# Patient Record
Sex: MALE | Race: WHITE | NOT HISPANIC OR LATINO
[De-identification: names, ages, dates, MRNs, and addresses within clinical notes are randomized per-mention and may not be internally consistent; named-entity substitution may affect disease eponyms.]

---

## 2020-10-12 ENCOUNTER — NON-APPOINTMENT (OUTPATIENT)
Age: 20
End: 2020-10-12

## 2020-10-13 ENCOUNTER — APPOINTMENT (OUTPATIENT)
Dept: CARDIOLOGY | Facility: CLINIC | Age: 20
End: 2020-10-13
Payer: COMMERCIAL

## 2020-10-13 VITALS
DIASTOLIC BLOOD PRESSURE: 88 MMHG | HEART RATE: 90 BPM | SYSTOLIC BLOOD PRESSURE: 127 MMHG | BODY MASS INDEX: 41.35 KG/M2 | HEIGHT: 71 IN | WEIGHT: 295.4 LBS | OXYGEN SATURATION: 96 %

## 2020-10-13 VITALS — TEMPERATURE: 97.2 F

## 2020-10-13 DIAGNOSIS — Z78.9 OTHER SPECIFIED HEALTH STATUS: ICD-10-CM

## 2020-10-13 DIAGNOSIS — E66.01 MORBID (SEVERE) OBESITY DUE TO EXCESS CALORIES: ICD-10-CM

## 2020-10-13 DIAGNOSIS — Z86.69 PERSONAL HISTORY OF OTHER DISEASES OF THE NERVOUS SYSTEM AND SENSE ORGANS: ICD-10-CM

## 2020-10-13 DIAGNOSIS — Z83.49 FAMILY HISTORY OF OTHER ENDOCRINE, NUTRITIONAL AND METABOLIC DISEASES: ICD-10-CM

## 2020-10-13 DIAGNOSIS — Z82.49 FAMILY HISTORY OF ISCHEMIC HEART DISEASE AND OTHER DISEASES OF THE CIRCULATORY SYSTEM: ICD-10-CM

## 2020-10-13 DIAGNOSIS — Z87.891 PERSONAL HISTORY OF NICOTINE DEPENDENCE: ICD-10-CM

## 2020-10-13 DIAGNOSIS — R06.00 DYSPNEA, UNSPECIFIED: ICD-10-CM

## 2020-10-13 DIAGNOSIS — Z86.59 PERSONAL HISTORY OF OTHER MENTAL AND BEHAVIORAL DISORDERS: ICD-10-CM

## 2020-10-13 PROCEDURE — 99244 OFF/OP CNSLTJ NEW/EST MOD 40: CPT

## 2020-10-13 RX ORDER — DEXTROAMPHETAMINE SACCHARATE, AMPHETAMINE ASPARTATE MONOHYDRATE, DEXTROAMPHETAMINE SULFATE AND AMPHETAMINE SULFATE 5; 5; 5; 5 MG/1; MG/1; MG/1; MG/1
20 CAPSULE, EXTENDED RELEASE ORAL DAILY
Qty: 30 | Refills: 0 | Status: ACTIVE | COMMUNITY
Start: 2020-10-13

## 2020-10-13 NOTE — HISTORY OF PRESENT ILLNESS
[FreeTextEntry1] : 20-year-old male being seen in cardiac evaluation prior to planned bariatric surgery.  He has been markedly overweight for the past 5 years.  He has no history of heart disease, but has noted progressive dyspnea on exertion for the past year which he has attributed to his weight. \par \par  He has no major medical problems and is never been hospitalized.  He has a history of obstructive sleep apnea and has been on Adderall since his youth for attention deficit disorder.

## 2020-10-13 NOTE — DISCUSSION/SUMMARY
[FreeTextEntry1] : In summary Mr. Hurtado is a 20-year-old male with no significant past medical history who is planning to have bariatric surgery for morbid obesity.  His exam shows a BMI of 41, normal blood pressure, clear lungs, and a normal cardiac exam.  An EKG done yesterday is within normal limits.\par \par His dyspnea on exertion needs to be evaluated before surgery and he will be scheduled for a stress echo.  Assuming it is unremarkable he is cleared to proceed.

## 2020-10-13 NOTE — PHYSICAL EXAM
[General Appearance - Well Developed] : well developed [Normal Appearance] : normal appearance [Well Groomed] : well groomed [General Appearance - Well Nourished] : well nourished [No Deformities] : no deformities [General Appearance - In No Acute Distress] : no acute distress [Normal Conjunctiva] : the conjunctiva exhibited no abnormalities [Eyelids - No Xanthelasma] : the eyelids demonstrated no xanthelasmas [Normal Oral Mucosa] : normal oral mucosa [No Oral Pallor] : no oral pallor [No Oral Cyanosis] : no oral cyanosis [Normal Jugular Venous A Waves Present] : normal jugular venous A waves present [Normal Jugular Venous V Waves Present] : normal jugular venous V waves present [No Jugular Venous Briggs A Waves] : no jugular venous briggs A waves [Heart Rate And Rhythm] : heart rate and rhythm were normal [Heart Sounds] : normal S1 and S2 [Murmurs] : no murmurs present [Respiration, Rhythm And Depth] : normal respiratory rhythm and effort [Exaggerated Use Of Accessory Muscles For Inspiration] : no accessory muscle use [FreeTextEntry1] : Few bibasilar rales [Abdomen Soft] : soft [Abdomen Tenderness] : non-tender [Abdomen Mass (___ Cm)] : no abdominal mass palpated [Abnormal Walk] : normal gait [Gait - Sufficient For Exercise Testing] : the gait was sufficient for exercise testing [Nail Clubbing] : no clubbing of the fingernails [Cyanosis, Localized] : no localized cyanosis [Petechial Hemorrhages (___cm)] : no petechial hemorrhages [Skin Color & Pigmentation] : normal skin color and pigmentation [] : no rash [No Venous Stasis] : no venous stasis [Skin Lesions] : no skin lesions [No Skin Ulcers] : no skin ulcer [No Xanthoma] : no  xanthoma was observed [Oriented To Time, Place, And Person] : oriented to person, place, and time [Affect] : the affect was normal [Mood] : the mood was normal [No Anxiety] : not feeling anxious

## 2020-11-05 ENCOUNTER — APPOINTMENT (OUTPATIENT)
Dept: PULMONOLOGY | Facility: CLINIC | Age: 20
End: 2020-11-05

## 2020-12-22 ENCOUNTER — APPOINTMENT (OUTPATIENT)
Dept: CARDIOLOGY | Facility: CLINIC | Age: 20
End: 2020-12-22

## 2021-01-25 ENCOUNTER — APPOINTMENT (OUTPATIENT)
Dept: CARDIOLOGY | Facility: CLINIC | Age: 21
End: 2021-01-25

## 2021-02-09 ENCOUNTER — APPOINTMENT (OUTPATIENT)
Dept: CARDIOLOGY | Facility: CLINIC | Age: 21
End: 2021-02-09

## 2021-02-12 ENCOUNTER — OUTPATIENT (OUTPATIENT)
Dept: OUTPATIENT SERVICES | Facility: HOSPITAL | Age: 21
LOS: 1 days | End: 2021-02-12
Payer: COMMERCIAL

## 2021-02-12 VITALS
RESPIRATION RATE: 18 BRPM | TEMPERATURE: 98 F | SYSTOLIC BLOOD PRESSURE: 123 MMHG | DIASTOLIC BLOOD PRESSURE: 86 MMHG | WEIGHT: 266.1 LBS | HEIGHT: 71 IN | HEART RATE: 69 BPM | OXYGEN SATURATION: 98 %

## 2021-02-12 DIAGNOSIS — E66.01 MORBID (SEVERE) OBESITY DUE TO EXCESS CALORIES: ICD-10-CM

## 2021-02-12 DIAGNOSIS — Z01.818 ENCOUNTER FOR OTHER PREPROCEDURAL EXAMINATION: ICD-10-CM

## 2021-02-12 DIAGNOSIS — Z29.9 ENCOUNTER FOR PROPHYLACTIC MEASURES, UNSPECIFIED: ICD-10-CM

## 2021-02-12 LAB
ALBUMIN SERPL ELPH-MCNC: 4.5 G/DL — SIGNIFICANT CHANGE UP (ref 3.3–5)
ANION GAP SERPL CALC-SCNC: 8 MMOL/L — SIGNIFICANT CHANGE UP (ref 5–17)
APPEARANCE UR: CLEAR — SIGNIFICANT CHANGE UP
APTT BLD: 34.1 SEC — SIGNIFICANT CHANGE UP (ref 27.5–35.5)
BASOPHILS # BLD AUTO: 0.02 K/UL — SIGNIFICANT CHANGE UP (ref 0–0.2)
BASOPHILS NFR BLD AUTO: 0.3 % — SIGNIFICANT CHANGE UP (ref 0–2)
BILIRUB UR-MCNC: NEGATIVE — SIGNIFICANT CHANGE UP
BUN SERPL-MCNC: 12 MG/DL — SIGNIFICANT CHANGE UP (ref 7–23)
CALCIUM SERPL-MCNC: 10 MG/DL — SIGNIFICANT CHANGE UP (ref 8.5–10.1)
CHLORIDE SERPL-SCNC: 104 MMOL/L — SIGNIFICANT CHANGE UP (ref 96–108)
CO2 SERPL-SCNC: 23 MMOL/L — SIGNIFICANT CHANGE UP (ref 22–31)
COHGB MFR BLDV: 1.3 % — SIGNIFICANT CHANGE UP (ref 0–1.5)
COLOR SPEC: YELLOW — SIGNIFICANT CHANGE UP
CREAT SERPL-MCNC: 0.98 MG/DL — SIGNIFICANT CHANGE UP (ref 0.5–1.3)
DIFF PNL FLD: NEGATIVE — SIGNIFICANT CHANGE UP
EOSINOPHIL # BLD AUTO: 0.15 K/UL — SIGNIFICANT CHANGE UP (ref 0–0.5)
EOSINOPHIL NFR BLD AUTO: 2.3 % — SIGNIFICANT CHANGE UP (ref 0–6)
GLUCOSE SERPL-MCNC: 70 MG/DL — SIGNIFICANT CHANGE UP (ref 70–99)
GLUCOSE UR QL: NEGATIVE MG/DL — SIGNIFICANT CHANGE UP
HCT VFR BLD CALC: 43.2 % — SIGNIFICANT CHANGE UP (ref 39–50)
HGB BLD CALC-MCNC: 14.7 G/DL — SIGNIFICANT CHANGE UP (ref 13–17)
HGB BLD-MCNC: 14.3 G/DL — SIGNIFICANT CHANGE UP (ref 13–17)
IMM GRANULOCYTES NFR BLD AUTO: 0.2 % — SIGNIFICANT CHANGE UP (ref 0–1.5)
INR BLD: 1.25 RATIO — HIGH (ref 0.88–1.16)
KETONES UR-MCNC: ABNORMAL
LEUKOCYTE ESTERASE UR-ACNC: NEGATIVE — SIGNIFICANT CHANGE UP
LYMPHOCYTES # BLD AUTO: 1.8 K/UL — SIGNIFICANT CHANGE UP (ref 1–3.3)
LYMPHOCYTES # BLD AUTO: 28 % — SIGNIFICANT CHANGE UP (ref 13–44)
MCHC RBC-ENTMCNC: 27.3 PG — SIGNIFICANT CHANGE UP (ref 27–34)
MCHC RBC-ENTMCNC: 33.1 GM/DL — SIGNIFICANT CHANGE UP (ref 32–36)
MCV RBC AUTO: 82.6 FL — SIGNIFICANT CHANGE UP (ref 80–100)
MONOCYTES # BLD AUTO: 0.36 K/UL — SIGNIFICANT CHANGE UP (ref 0–0.9)
MONOCYTES NFR BLD AUTO: 5.6 % — SIGNIFICANT CHANGE UP (ref 2–14)
NEUTROPHILS # BLD AUTO: 4.08 K/UL — SIGNIFICANT CHANGE UP (ref 1.8–7.4)
NEUTROPHILS NFR BLD AUTO: 63.6 % — SIGNIFICANT CHANGE UP (ref 43–77)
NITRITE UR-MCNC: NEGATIVE — SIGNIFICANT CHANGE UP
PH UR: 5 — SIGNIFICANT CHANGE UP (ref 5–8)
PLATELET # BLD AUTO: 258 K/UL — SIGNIFICANT CHANGE UP (ref 150–400)
POTASSIUM SERPL-MCNC: 4 MMOL/L — SIGNIFICANT CHANGE UP (ref 3.5–5.3)
POTASSIUM SERPL-SCNC: 4 MMOL/L — SIGNIFICANT CHANGE UP (ref 3.5–5.3)
PROT UR-MCNC: NEGATIVE MG/DL — SIGNIFICANT CHANGE UP
PROTHROM AB SERPL-ACNC: 14.3 SEC — HIGH (ref 10.6–13.6)
RBC # BLD: 5.23 M/UL — SIGNIFICANT CHANGE UP (ref 4.2–5.8)
RBC # FLD: 13.8 % — SIGNIFICANT CHANGE UP (ref 10.3–14.5)
SODIUM SERPL-SCNC: 135 MMOL/L — SIGNIFICANT CHANGE UP (ref 135–145)
SP GR SPEC: 1 — LOW (ref 1.01–1.02)
UROBILINOGEN FLD QL: NEGATIVE MG/DL — SIGNIFICANT CHANGE UP
WBC # BLD: 6.42 K/UL — SIGNIFICANT CHANGE UP (ref 3.8–10.5)
WBC # FLD AUTO: 6.42 K/UL — SIGNIFICANT CHANGE UP (ref 3.8–10.5)

## 2021-02-12 PROCEDURE — 93005 ELECTROCARDIOGRAM TRACING: CPT

## 2021-02-12 PROCEDURE — 93010 ELECTROCARDIOGRAM REPORT: CPT

## 2021-02-12 PROCEDURE — 82040 ASSAY OF SERUM ALBUMIN: CPT

## 2021-02-12 PROCEDURE — 85025 COMPLETE CBC W/AUTO DIFF WBC: CPT

## 2021-02-12 PROCEDURE — 86901 BLOOD TYPING SEROLOGIC RH(D): CPT

## 2021-02-12 PROCEDURE — 71046 X-RAY EXAM CHEST 2 VIEWS: CPT

## 2021-02-12 PROCEDURE — 81003 URINALYSIS AUTO W/O SCOPE: CPT

## 2021-02-12 PROCEDURE — 36415 COLL VENOUS BLD VENIPUNCTURE: CPT

## 2021-02-12 PROCEDURE — 86900 BLOOD TYPING SEROLOGIC ABO: CPT

## 2021-02-12 PROCEDURE — 85730 THROMBOPLASTIN TIME PARTIAL: CPT

## 2021-02-12 PROCEDURE — 71046 X-RAY EXAM CHEST 2 VIEWS: CPT | Mod: 26

## 2021-02-12 PROCEDURE — 86850 RBC ANTIBODY SCREEN: CPT

## 2021-02-12 PROCEDURE — 85610 PROTHROMBIN TIME: CPT

## 2021-02-12 PROCEDURE — 82375 ASSAY CARBOXYHB QUANT: CPT

## 2021-02-12 PROCEDURE — 80048 BASIC METABOLIC PNL TOTAL CA: CPT

## 2021-02-12 PROCEDURE — G0463: CPT | Mod: 25

## 2021-02-12 NOTE — H&P PST ADULT - HISTORY OF PRESENT ILLNESS
20 year old man presents to [facility] for preprocedure exam. Patient is for planned laparoscopic sleeve gastrectomy and intraoperative endoscopy with Dr Johnson. Patient is now on liquid diet and tolerating it well. No acute complaints.

## 2021-02-12 NOTE — H&P PST ADULT - NSANTHOSAYNRD_GEN_A_CORE
No. JUANIS screening performed.  STOP BANG Legend: 0-2 = LOW Risk; 3-4 = INTERMEDIATE Risk; 5-8 = HIGH Risk

## 2021-02-12 NOTE — H&P PST ADULT - ASSESSMENT
20 year old man presents to PST for preprocedure exam. Patient is for planned laparoscopic sleeve gastrectomy and intraoperative endoscopy with Dr Johnson.        Plan:  - PST instructions given ; NPO status instructions to be given by [facility] .  - Pt instructed to take following meds with sip of water : adderall  - Pt instructed to take routine evening medications unless indicated .  -  Stop NSAIDS ( Aspirin Alev Motrin Mobic Diclofenac), herbal supplements , MVI , Vitamin fish oil 7 days prior to surgery  unless   directed by surgeon or cardiologist;   - Medical Optimization  with Dr Díaz  - EZ wash instructions given   - Labs EKG CXR as per surgeon request.   -  Pt instructed to self quarantine .  - Covid Testing scheduled  Pt notified and aware.  - Pt denies covid symptoms shortness of breath fever cough .      CAPRINI SCORE [CLOT]    AGE RELATED RISK FACTORS                                                       MOBILITY RELATED FACTORS  [ ] Age 41-60 years                                            (1 Point)                  [ ] Bed rest                                                        (1 Point)  [ ] Age: 61-74 years                                           (2 Points)                 [ ] Plaster cast                                                   (2 Points)  [ ] Age= 75 years                                              (3 Points)                 [ ] Bed bound for more than 72 hours                 (2 Points)    DISEASE RELATED RISK FACTORS                                               GENDER SPECIFIC FACTORS  [ ] Edema in the lower extremities                       (1 Point)                  [ ] Pregnancy                                                     (1 Point)  [ ] Varicose veins                                               (1 Point)                  [ ] Post-partum < 6 weeks                                   (1 Point)             [x ] BMI > 25 Kg/m2                                            (1 Point)                  [ ] Hormonal therapy  or oral contraception          (1 Point)                 [ ] Sepsis (in the previous month)                        (1 Point)                  [ ] History of pregnancy complications                 (1 point)  [ ] Pneumonia or serious lung disease                                               [ ] Unexplained or recurrent                     (1 Point)           (in the previous month)                               (1 Point)  [ ] Abnormal pulmonary function test                     (1 Point)                 SURGERY RELATED RISK FACTORS  [ ] Acute myocardial infarction                              (1 Point)                 [ ]  Section                                             (1 Point)  [ ] Congestive heart failure (in the previous month)  (1 Point)               [ ] Minor surgery                                                  (1 Point)   [ ] Inflammatory bowel disease                             (1 Point)                 [ ] Arthroscopic surgery                                        (2 Points)  [ ] Central venous access                                      (2 Points)                [x ] General surgery lasting more than 45 minutes   (2 Points)       [ ] Stroke (in the previous month)                          (5 Points)               [ ] Elective arthroplasty                                         (5 Points)                                                                                                                                               HEMATOLOGY RELATED FACTORS                                                 TRAUMA RELATED RISK FACTORS  [ ] Prior episodes of VTE                                     (3 Points)                [ ] Fracture of the hip, pelvis, or leg                       (5 Points)  [ ] Positive family history for VTE                         (3 Points)                 [ ] Acute spinal cord injury (in the previous month)  (5 Points)  [ ] Prothrombin 76768 A                                     (3 Points)                 [ ] Paralysis  (less than 1 month)                             (5 Points)  [ ] Factor V Leiden                                             (3 Points)                  [ ] Multiple Trauma within 1 month                        (5 Points)  [ ] Lupus anticoagulants                                     (3 Points)                                                           [ ] Anticardiolipin antibodies                               (3 Points)                                                       [ ] High homocysteine in the blood                      (3 Points)                                             [ ] Other congenital or acquired thrombophilia      (3 Points)                                                [ ] Heparin induced thrombocytopenia                  (3 Points)                                          Total Score [    3      ]    Caprini Score 0 - 2:  Low Risk, No VTE Prophylaxis required for most patients, encourage ambulation  Caprini Score 3 - 6:  At Risk, pharmacologic VTE prophylaxis is indicated for most patients (in the absence of a contraindication)  Caprini Score Greater than or = 7:  High Risk, pharmacologic VTE prophylaxis is indicated for most patients (in the absence of a contraindication)   20 year old man presents to PST for preprocedure exam. Patient is for planned laparoscopic sleeve gastrectomy and intraoperative endoscopy with Dr Johnson.        Plan:  - PST instructions given ; NPO status instructions to be given by [facility] .  - Pt instructed to take following meds with sip of water : no meds  - Pt instructed to take routine evening medications unless indicated .  -  Stop NSAIDS ( Aspirin Alev Motrin Mobic Diclofenac), herbal supplements , MVI , Vitamin fish oil 7 days prior to surgery  unless   directed by surgeon or cardiologist;   - Medical Optimization  with Dr Díaz  - EZ wash instructions given   - Labs EKG CXR as per surgeon request.   -  Pt instructed to self quarantine .  - Covid Testing scheduled  Pt notified and aware.  - Pt denies covid symptoms shortness of breath fever cough .      CAPRINI SCORE [CLOT]    AGE RELATED RISK FACTORS                                                       MOBILITY RELATED FACTORS  [ ] Age 41-60 years                                            (1 Point)                  [ ] Bed rest                                                        (1 Point)  [ ] Age: 61-74 years                                           (2 Points)                 [ ] Plaster cast                                                   (2 Points)  [ ] Age= 75 years                                              (3 Points)                 [ ] Bed bound for more than 72 hours                 (2 Points)    DISEASE RELATED RISK FACTORS                                               GENDER SPECIFIC FACTORS  [ ] Edema in the lower extremities                       (1 Point)                  [ ] Pregnancy                                                     (1 Point)  [ ] Varicose veins                                               (1 Point)                  [ ] Post-partum < 6 weeks                                   (1 Point)             [x ] BMI > 25 Kg/m2                                            (1 Point)                  [ ] Hormonal therapy  or oral contraception          (1 Point)                 [ ] Sepsis (in the previous month)                        (1 Point)                  [ ] History of pregnancy complications                 (1 point)  [ ] Pneumonia or serious lung disease                                               [ ] Unexplained or recurrent                     (1 Point)           (in the previous month)                               (1 Point)  [ ] Abnormal pulmonary function test                     (1 Point)                 SURGERY RELATED RISK FACTORS  [ ] Acute myocardial infarction                              (1 Point)                 [ ]  Section                                             (1 Point)  [ ] Congestive heart failure (in the previous month)  (1 Point)               [ ] Minor surgery                                                  (1 Point)   [ ] Inflammatory bowel disease                             (1 Point)                 [ ] Arthroscopic surgery                                        (2 Points)  [ ] Central venous access                                      (2 Points)                [x ] General surgery lasting more than 45 minutes   (2 Points)       [ ] Stroke (in the previous month)                          (5 Points)               [ ] Elective arthroplasty                                         (5 Points)                                                                                                                                               HEMATOLOGY RELATED FACTORS                                                 TRAUMA RELATED RISK FACTORS  [ ] Prior episodes of VTE                                     (3 Points)                [ ] Fracture of the hip, pelvis, or leg                       (5 Points)  [ ] Positive family history for VTE                         (3 Points)                 [ ] Acute spinal cord injury (in the previous month)  (5 Points)  [ ] Prothrombin 24711 A                                     (3 Points)                 [ ] Paralysis  (less than 1 month)                             (5 Points)  [ ] Factor V Leiden                                             (3 Points)                  [ ] Multiple Trauma within 1 month                        (5 Points)  [ ] Lupus anticoagulants                                     (3 Points)                                                           [ ] Anticardiolipin antibodies                               (3 Points)                                                       [ ] High homocysteine in the blood                      (3 Points)                                             [ ] Other congenital or acquired thrombophilia      (3 Points)                                                [ ] Heparin induced thrombocytopenia                  (3 Points)                                          Total Score [    3      ]    Caprini Score 0 - 2:  Low Risk, No VTE Prophylaxis required for most patients, encourage ambulation  Caprini Score 3 - 6:  At Risk, pharmacologic VTE prophylaxis is indicated for most patients (in the absence of a contraindication)  Caprini Score Greater than or = 7:  High Risk, pharmacologic VTE prophylaxis is indicated for most patients (in the absence of a contraindication)

## 2021-02-12 NOTE — H&P PST ADULT - NSICDXPASTMEDICALHX_GEN_ALL_CORE_FT
PAST MEDICAL HISTORY:  ADHD (attention deficit hyperactivity disorder)     Asthma     Bipolar disorder     Depression     Mood disorder     Morbid obesity

## 2021-02-13 DIAGNOSIS — Z01.818 ENCOUNTER FOR OTHER PREPROCEDURAL EXAMINATION: ICD-10-CM

## 2021-02-13 DIAGNOSIS — E66.01 MORBID (SEVERE) OBESITY DUE TO EXCESS CALORIES: ICD-10-CM

## 2021-02-20 DIAGNOSIS — Z01.818 ENCOUNTER FOR OTHER PREPROCEDURAL EXAMINATION: ICD-10-CM

## 2021-02-21 ENCOUNTER — TRANSCRIPTION ENCOUNTER (OUTPATIENT)
Age: 21
End: 2021-02-21

## 2021-02-21 ENCOUNTER — APPOINTMENT (OUTPATIENT)
Dept: DISASTER EMERGENCY | Facility: CLINIC | Age: 21
End: 2021-02-21

## 2021-02-21 LAB — SARS-COV-2 N GENE NPH QL NAA+PROBE: NOT DETECTED

## 2021-02-23 RX ORDER — SODIUM CHLORIDE 9 MG/ML
3 INJECTION INTRAMUSCULAR; INTRAVENOUS; SUBCUTANEOUS EVERY 8 HOURS
Refills: 0 | Status: DISCONTINUED | OUTPATIENT
Start: 2021-02-24 | End: 2021-02-25

## 2021-02-23 RX ORDER — HYDROMORPHONE HYDROCHLORIDE 2 MG/ML
1 INJECTION INTRAMUSCULAR; INTRAVENOUS; SUBCUTANEOUS
Refills: 0 | Status: DISCONTINUED | OUTPATIENT
Start: 2021-02-24 | End: 2021-02-24

## 2021-02-23 RX ORDER — SODIUM CHLORIDE 9 MG/ML
1000 INJECTION, SOLUTION INTRAVENOUS
Refills: 0 | Status: DISCONTINUED | OUTPATIENT
Start: 2021-02-24 | End: 2021-02-24

## 2021-02-23 RX ORDER — FENTANYL CITRATE 50 UG/ML
25 INJECTION INTRAVENOUS
Refills: 0 | Status: DISCONTINUED | OUTPATIENT
Start: 2021-02-24 | End: 2021-02-24

## 2021-02-23 RX ORDER — MEPERIDINE HYDROCHLORIDE 50 MG/ML
12.5 INJECTION INTRAMUSCULAR; INTRAVENOUS; SUBCUTANEOUS
Refills: 0 | Status: DISCONTINUED | OUTPATIENT
Start: 2021-02-24 | End: 2021-02-24

## 2021-02-24 ENCOUNTER — RESULT REVIEW (OUTPATIENT)
Age: 21
End: 2021-02-24

## 2021-02-24 ENCOUNTER — INPATIENT (INPATIENT)
Facility: HOSPITAL | Age: 21
LOS: 0 days | Discharge: HOME | DRG: 621 | End: 2021-02-25
Attending: SURGERY | Admitting: SURGERY
Payer: COMMERCIAL

## 2021-02-24 VITALS
HEIGHT: 70 IN | WEIGHT: 255.07 LBS | HEART RATE: 65 BPM | TEMPERATURE: 98 F | DIASTOLIC BLOOD PRESSURE: 88 MMHG | OXYGEN SATURATION: 100 % | SYSTOLIC BLOOD PRESSURE: 135 MMHG | RESPIRATION RATE: 16 BRPM

## 2021-02-24 DIAGNOSIS — E66.01 MORBID (SEVERE) OBESITY DUE TO EXCESS CALORIES: ICD-10-CM

## 2021-02-24 DIAGNOSIS — G47.33 OBSTRUCTIVE SLEEP APNEA (ADULT) (PEDIATRIC): ICD-10-CM

## 2021-02-24 DIAGNOSIS — K21.9 GASTRO-ESOPHAGEAL REFLUX DISEASE WITHOUT ESOPHAGITIS: ICD-10-CM

## 2021-02-24 DIAGNOSIS — J45.909 UNSPECIFIED ASTHMA, UNCOMPLICATED: ICD-10-CM

## 2021-02-24 PROCEDURE — C9113: CPT

## 2021-02-24 PROCEDURE — 85025 COMPLETE CBC W/AUTO DIFF WBC: CPT

## 2021-02-24 PROCEDURE — 82962 GLUCOSE BLOOD TEST: CPT

## 2021-02-24 PROCEDURE — C1889: CPT

## 2021-02-24 PROCEDURE — 36415 COLL VENOUS BLD VENIPUNCTURE: CPT

## 2021-02-24 PROCEDURE — 80048 BASIC METABOLIC PNL TOTAL CA: CPT

## 2021-02-24 PROCEDURE — C9290: CPT

## 2021-02-24 PROCEDURE — 88307 TISSUE EXAM BY PATHOLOGIST: CPT

## 2021-02-24 PROCEDURE — 88307 TISSUE EXAM BY PATHOLOGIST: CPT | Mod: 26

## 2021-02-24 RX ORDER — FAMOTIDINE 10 MG/ML
20 INJECTION INTRAVENOUS ONCE
Refills: 0 | Status: COMPLETED | OUTPATIENT
Start: 2021-02-24 | End: 2021-02-24

## 2021-02-24 RX ORDER — OXYCODONE HYDROCHLORIDE 5 MG/1
10 TABLET ORAL EVERY 4 HOURS
Refills: 0 | Status: DISCONTINUED | OUTPATIENT
Start: 2021-02-24 | End: 2021-02-25

## 2021-02-24 RX ORDER — ONDANSETRON 8 MG/1
4 TABLET, FILM COATED ORAL EVERY 6 HOURS
Refills: 0 | Status: DISCONTINUED | OUTPATIENT
Start: 2021-02-24 | End: 2021-02-25

## 2021-02-24 RX ORDER — ENOXAPARIN SODIUM 100 MG/ML
40 INJECTION SUBCUTANEOUS EVERY 12 HOURS
Refills: 0 | Status: DISCONTINUED | OUTPATIENT
Start: 2021-02-24 | End: 2021-02-25

## 2021-02-24 RX ORDER — SODIUM CHLORIDE 9 MG/ML
1000 INJECTION, SOLUTION INTRAVENOUS
Refills: 0 | Status: DISCONTINUED | OUTPATIENT
Start: 2021-02-24 | End: 2021-02-25

## 2021-02-24 RX ORDER — OXYCODONE HYDROCHLORIDE 5 MG/1
5 TABLET ORAL EVERY 4 HOURS
Refills: 0 | Status: DISCONTINUED | OUTPATIENT
Start: 2021-02-24 | End: 2021-02-25

## 2021-02-24 RX ORDER — ACETAMINOPHEN 500 MG
1000 TABLET ORAL ONCE
Refills: 0 | Status: DISCONTINUED | OUTPATIENT
Start: 2021-02-24 | End: 2021-02-25

## 2021-02-24 RX ORDER — PANTOPRAZOLE SODIUM 20 MG/1
40 TABLET, DELAYED RELEASE ORAL EVERY 24 HOURS
Refills: 0 | Status: DISCONTINUED | OUTPATIENT
Start: 2021-02-24 | End: 2021-02-25

## 2021-02-24 RX ORDER — ACETAMINOPHEN 500 MG
1000 TABLET ORAL ONCE
Refills: 0 | Status: COMPLETED | OUTPATIENT
Start: 2021-02-24 | End: 2021-02-24

## 2021-02-24 RX ORDER — KETOROLAC TROMETHAMINE 30 MG/ML
30 SYRINGE (ML) INJECTION EVERY 6 HOURS
Refills: 0 | Status: DISCONTINUED | OUTPATIENT
Start: 2021-02-24 | End: 2021-02-25

## 2021-02-24 RX ORDER — APREPITANT 80 MG/1
80 CAPSULE ORAL ONCE
Refills: 0 | Status: COMPLETED | OUTPATIENT
Start: 2021-02-24 | End: 2021-02-24

## 2021-02-24 RX ORDER — HEPARIN SODIUM 5000 [USP'U]/ML
5000 INJECTION INTRAVENOUS; SUBCUTANEOUS ONCE
Refills: 0 | Status: COMPLETED | OUTPATIENT
Start: 2021-02-24 | End: 2021-02-24

## 2021-02-24 RX ADMIN — SODIUM CHLORIDE 3 MILLILITER(S): 9 INJECTION INTRAMUSCULAR; INTRAVENOUS; SUBCUTANEOUS at 21:34

## 2021-02-24 RX ADMIN — Medication 400 MILLIGRAM(S): at 21:57

## 2021-02-24 RX ADMIN — HEPARIN SODIUM 5000 UNIT(S): 5000 INJECTION INTRAVENOUS; SUBCUTANEOUS at 08:34

## 2021-02-24 RX ADMIN — HYDROMORPHONE HYDROCHLORIDE 1 MILLIGRAM(S): 2 INJECTION INTRAMUSCULAR; INTRAVENOUS; SUBCUTANEOUS at 13:40

## 2021-02-24 RX ADMIN — Medication 30 MILLIGRAM(S): at 17:26

## 2021-02-24 RX ADMIN — PANTOPRAZOLE SODIUM 40 MILLIGRAM(S): 20 TABLET, DELAYED RELEASE ORAL at 13:20

## 2021-02-24 RX ADMIN — FAMOTIDINE 20 MILLIGRAM(S): 10 INJECTION INTRAVENOUS at 08:33

## 2021-02-24 RX ADMIN — HYDROMORPHONE HYDROCHLORIDE 1 MILLIGRAM(S): 2 INJECTION INTRAMUSCULAR; INTRAVENOUS; SUBCUTANEOUS at 15:03

## 2021-02-24 RX ADMIN — ENOXAPARIN SODIUM 40 MILLIGRAM(S): 100 INJECTION SUBCUTANEOUS at 21:56

## 2021-02-24 RX ADMIN — APREPITANT 80 MILLIGRAM(S): 80 CAPSULE ORAL at 08:34

## 2021-02-24 RX ADMIN — Medication 30 MILLIGRAM(S): at 21:57

## 2021-02-24 NOTE — BRIEF OPERATIVE NOTE - OPERATION/FINDINGS
Patient underwent laparoscopic sleeve gastrectomy over 40 Amharic bougie without any complications. Intraoperative EGD confirmed intact nonbleeding staple line with negative leak test.

## 2021-02-24 NOTE — BRIEF OPERATIVE NOTE - NSICDXBRIEFPROCEDURE_GEN_ALL_CORE_FT
PROCEDURES:  EGD 24-Feb-2021 13:04:24  Luis Rae  Bilateral injection of anesthetic agent into tissue plane defined by transversus abdominis muscle 24-Feb-2021 13:04:20  Luis Rae  Laparoscopic sleeve gastrectomy 24-Feb-2021 13:04:17  Luis Rae

## 2021-02-24 NOTE — [facility] PREOP CHECKLIST - CHLOROHEXIDINE WASH 3
CHEST TWO VIEWS

1/23/18

 

The heart is normal in size and the lungs are clear. No infiltrate or effusion is seen. The trachea i
s midline. The bony structures were unremarkable. 

 

IMPRESSION:  

No acute thoracic finding. 

 

POS: HOME 24-Feb-2021

## 2021-02-25 ENCOUNTER — TRANSCRIPTION ENCOUNTER (OUTPATIENT)
Age: 21
End: 2021-02-25

## 2021-02-25 VITALS
TEMPERATURE: 98 F | HEART RATE: 55 BPM | DIASTOLIC BLOOD PRESSURE: 90 MMHG | OXYGEN SATURATION: 98 % | SYSTOLIC BLOOD PRESSURE: 141 MMHG | RESPIRATION RATE: 17 BRPM

## 2021-02-25 LAB
ANION GAP SERPL CALC-SCNC: 14 MMOL/L — SIGNIFICANT CHANGE UP (ref 5–17)
BASOPHILS # BLD AUTO: 0 K/UL — SIGNIFICANT CHANGE UP (ref 0–0.2)
BASOPHILS NFR BLD AUTO: 0 % — SIGNIFICANT CHANGE UP (ref 0–2)
BUN SERPL-MCNC: 5 MG/DL — LOW (ref 7–23)
CALCIUM SERPL-MCNC: 9.4 MG/DL — SIGNIFICANT CHANGE UP (ref 8.5–10.1)
CHLORIDE SERPL-SCNC: 105 MMOL/L — SIGNIFICANT CHANGE UP (ref 96–108)
CO2 SERPL-SCNC: 18 MMOL/L — LOW (ref 22–31)
CREAT SERPL-MCNC: 0.78 MG/DL — SIGNIFICANT CHANGE UP (ref 0.5–1.3)
EOSINOPHIL # BLD AUTO: 0 K/UL — SIGNIFICANT CHANGE UP (ref 0–0.5)
EOSINOPHIL NFR BLD AUTO: 0 % — SIGNIFICANT CHANGE UP (ref 0–6)
GLUCOSE SERPL-MCNC: 98 MG/DL — SIGNIFICANT CHANGE UP (ref 70–99)
HCT VFR BLD CALC: 37.6 % — LOW (ref 39–50)
HGB BLD-MCNC: 12.5 G/DL — LOW (ref 13–17)
IMM GRANULOCYTES NFR BLD AUTO: 0.3 % — SIGNIFICANT CHANGE UP (ref 0–1.5)
LYMPHOCYTES # BLD AUTO: 0.73 K/UL — LOW (ref 1–3.3)
LYMPHOCYTES # BLD AUTO: 12.8 % — LOW (ref 13–44)
MCHC RBC-ENTMCNC: 27.2 PG — SIGNIFICANT CHANGE UP (ref 27–34)
MCHC RBC-ENTMCNC: 33.2 GM/DL — SIGNIFICANT CHANGE UP (ref 32–36)
MCV RBC AUTO: 81.9 FL — SIGNIFICANT CHANGE UP (ref 80–100)
MONOCYTES # BLD AUTO: 0.36 K/UL — SIGNIFICANT CHANGE UP (ref 0–0.9)
MONOCYTES NFR BLD AUTO: 6.3 % — SIGNIFICANT CHANGE UP (ref 2–14)
NEUTROPHILS # BLD AUTO: 4.61 K/UL — SIGNIFICANT CHANGE UP (ref 1.8–7.4)
NEUTROPHILS NFR BLD AUTO: 80.6 % — HIGH (ref 43–77)
PLATELET # BLD AUTO: 172 K/UL — SIGNIFICANT CHANGE UP (ref 150–400)
POTASSIUM SERPL-MCNC: 4.1 MMOL/L — SIGNIFICANT CHANGE UP (ref 3.5–5.3)
POTASSIUM SERPL-SCNC: 4.1 MMOL/L — SIGNIFICANT CHANGE UP (ref 3.5–5.3)
RBC # BLD: 4.59 M/UL — SIGNIFICANT CHANGE UP (ref 4.2–5.8)
RBC # FLD: 14.8 % — HIGH (ref 10.3–14.5)
SODIUM SERPL-SCNC: 137 MMOL/L — SIGNIFICANT CHANGE UP (ref 135–145)
WBC # BLD: 5.72 K/UL — SIGNIFICANT CHANGE UP (ref 3.8–10.5)
WBC # FLD AUTO: 5.72 K/UL — SIGNIFICANT CHANGE UP (ref 3.8–10.5)

## 2021-02-25 RX ORDER — ACETAMINOPHEN 500 MG
1000 TABLET ORAL ONCE
Refills: 0 | Status: COMPLETED | OUTPATIENT
Start: 2021-02-25 | End: 2021-02-25

## 2021-02-25 RX ADMIN — Medication 30 MILLIGRAM(S): at 05:07

## 2021-02-25 RX ADMIN — Medication 30 MILLIGRAM(S): at 10:30

## 2021-02-25 RX ADMIN — SODIUM CHLORIDE 150 MILLILITER(S): 9 INJECTION, SOLUTION INTRAVENOUS at 05:07

## 2021-02-25 RX ADMIN — Medication 400 MILLIGRAM(S): at 05:06

## 2021-02-25 RX ADMIN — PANTOPRAZOLE SODIUM 40 MILLIGRAM(S): 20 TABLET, DELAYED RELEASE ORAL at 11:51

## 2021-02-25 RX ADMIN — SODIUM CHLORIDE 3 MILLILITER(S): 9 INJECTION INTRAMUSCULAR; INTRAVENOUS; SUBCUTANEOUS at 04:00

## 2021-02-25 RX ADMIN — ENOXAPARIN SODIUM 40 MILLIGRAM(S): 100 INJECTION SUBCUTANEOUS at 09:32

## 2021-02-25 NOTE — DISCHARGE NOTE NURSING/CASE MANAGEMENT/SOCIAL WORK - PATIENT PORTAL LINK FT
You can access the FollowMyHealth Patient Portal offered by [facility] by registering at the following website: [email or web address]. By joining [facility]’s FollowMyHealth portal, you will also be able to view your health information using other applications (apps) compatible with our system.

## 2021-02-25 NOTE — DISCHARGE NOTE PROVIDER - CARE PROVIDER_API CALL
Flaco Johnson)  Surgery  [address], [address]  [place] [address]  Phone: [phone]  Fax: [phone]  Follow Up Time: 2 weeks

## 2021-02-25 NOTE — DISCHARGE NOTE PROVIDER - HOSPITAL COURSE
pt presented to the [facility] for elective bariatric surgery. Tolerated procedure well and is ok for discharge home

## 2021-03-22 ENCOUNTER — APPOINTMENT (OUTPATIENT)
Dept: CARDIOLOGY | Facility: CLINIC | Age: 21
End: 2021-03-22

## 2021-05-27 NOTE — H&P PST ADULT -  CURRENT SWALLOWING
Call Center TCM Note      Responses   [facility] patient discharged from?  [place]   
(0) swallows foods and liquids w/o difficulty

## 2022-10-18 NOTE — CHART NOTE - NSCHARTNOTEFT_GEN_A_CORE
Chief Complaint   Patient presents with   • Derm Problem     Skin exam   • Office Visit       HISTORY OF PRESENT ILLNESS:     The patient is a 40 year old female who goes by the name Jacque.      The patient presents for evaluation of the following lesion:    Location:  The lesion is located on the following part of the body:   Under left breast  Duration:  The lesion was first noticed by the patient around the following time: 1 year  Quality:     The lesion  is painful,bleed  Severity:    The severity of the symptoms is moderate  Modifying Factors: Previous treatments include nothing      REVIEW OF SYSTEMS:  Constitutional:  In general, the patient feels well:  Yes  Cardiovascular:   The patient has a pacemaker:    no      The patient has a defibrillator:    no  Hematologic:  The patient bleeds easily because of being on aspirin or an anticoagulant: no       ALLERGIES:   Allergen Reactions   • Seasonal Other (See Comments)     Pollen, grass       Current Outpatient Medications   Medication Sig   • albuterol 108 (90 Base) MCG/ACT inhaler Inhale 2 puffs into the lungs every 4 hours as needed for Shortness of Breath or Wheezing.   • ERENumab-aooe (AIMOVIG) 70 MG/ML injection Inject 1 mL into the skin every 30 days.   • galcanezumab-gnlm (EMGALITY) 120 MG/ML injection Inject 1 mL into the skin every 30 days.   • electrolyte/PEG 3350 (Nulytely with Flavor Packs) 420 g solution Use as directed for colonoscopy prep   • rimegepant sulfate (NURTEC ODT) 75 MG disintegrating tablet Take 1 tablet by mouth every other day. Max 75 mg/24 hours.   • oxybutynin (DITROPAN-XL) 5 MG 24 hr tablet TAKE 2 TABLETS BY MOUTH DAILY   • benzonatate (TESSALON PERLES) 200 MG capsule Take 1 capsule by mouth every 8 hours as needed for Cough.   • omeprazole (PrilOSEC) 20 MG capsule TAKE 1 CAPSULE BY MOUTH TWO TIMES A DAY BEFORE MEALS   • escitalopram (LEXAPRO) 20 MG tablet Take 1 tablet by mouth daily.   • pregabalin (LYRICA) 25 MG capsule Take 1  Surgery date: 2/24/21    Pre-Operative Diagnosis: Refractory morbid obesity with multiple comorbid conditions.    Post-operative Diagnosis: Same    Primary Procedure  [56494] Lap Longitudinal Gastrectomy     Secondary Procedure(s)  [16182] Bilateral TAP Block  [34197] Uppr Gi Endoscopy, Diagnosis     Surgeon(s)  Surgeon:  Flaco Johnson MD  Assistant surgeon: Luis Rae MD    Anesthesia type: General Anesthesia     Specimens:  partial gastrectomy sent to pathology     Estimated blood loss: 30 ml     DRAINS: NONE    COMPLICATIONS: NONE     INDICATIONS OF THE PROCEDURE: The patient is a 21-year-old male who is morbidly obese with a body mass index of 41. The patient has multiple comorbidities due to his morbid obesity including obstructive sleep apnea, asthma and back pain. The patient has failed multiple nonoperative attempts at weight loss. The patient meets NIH criteria for bariatric surgery and underwent appropriate preoperative workup, education, and signed the consent form freely. The patient had risks and benefits explained including, but not limited to, bleeding, leak, infection, disability, injury to transient structures, aspiration, DVT, pulmonary embolism, and death. The patient understood and agreed to proceed with surgery.     DESCRIPTION OF PROCEDURE: The patient was identified properly via a time-out. The patient was brought into the operating room and was placed on the operating room table in supine position. The patient was then given general endotracheal anesthesia and was given preoperative antibiotics. Preoperative heparin was given in the ambulatory surgery unit. The patient was then prepped and draped in the usual sterile fashion. An Exparel mixture was mixed at the back table with 20 mL of Exparel, 30 mL of 0.25% Marcaine and 150 mL of normal saline. A Veress needle was placed in the left upper quadrant. The abdomen was insufflated to 15 millimeters of mercury. Once the abdomen was insufflated, the Exparel mixture was given subcutaneously at the sites of incision. An incision was made within the umbilicus and a 12-millimeter trocar was placed in the abdomen. The laparoscope was inserted and there was no injury on initial trocar placement or initial Veress needle placement. A Transversus Abdominus Plane Block was then conducted by placing 20 mL of the Exparel mixture preperitoneal at the distribution of the spinal nerves on the lateral abdominal wall. This was started at the costal margin at the mid axillary line. 20cc of the Exparel mixture was placed in this area. Another 20 mL was placed four centimeters caudal to this area. Another 20 mL was placed four centimeters caudal to this area and another 15 mL was placed four centimeters caudal to this area. This was repeated on the opposite side of the body in a similar fashion. The rest of the Exparel was placed into the subcutaneous tissues and preperitoneal at every trocar site. A 5-millimeter and 15-millimeter trocar was placed in the right upper quadrant. A 12-millimeter and 5-millimeter trocar was placed in left upper quadrant. An incision was made in subxiphoid area and a liver retractor was placed to hold the liver anteriorly and superiorly and was held in place using Mediflex device. The patient was then placed into steep reverse Trendelenburg position and a point along the stomach was then measured approximately 5 centimeters proximal to the pylorus and the greater curvature of the stomach was taken down from that point. The greater curvature was taken down all the way to the angle of His and the stomach was removed from the left crura using the Ligasure device. At this point, the stomach was completely mobilized. A 40-Samoan bougie was then placed into the stomach and placed into the antrum. At this point a 60 millimeter iDrive stapler with a tri-staple black load was used to start transecting the stomach along the bougie, approximately 5 more firings using the purple load were used to staple the stomach to wrap around the bougie to make a nice sleeve around the bougie. Once the stomach was completely transected, the stomach was placed into an EndoCatch bag and removed from the abdomen. A Vicryl suture was used to oversew the staple line in a Lembert fashion from the top of the suture line to about longterm down the sleeve gastrectomy in a continuous running fashion. Two sutures, 3-0 sutures were placed in the antrum of the stomach to the omental fat so that the sleeve would not rotate. Hemostasis was achieved, the remaining stomach was placed under saline solution and an endoscopy was then conducted. There was no leak from the staple line. There was no bleeding or stricture at the staple line. Once the endoscopy was completed, complete hemostasis was assured. The 15-millimeter trocar was then removed and the fascia was closed with a 0 Vicryl suture using the suture passer. Upon completion of the procedure, the abdomen was desufflated and all trocars were removed. The skin was closed with 4-0 Monocryl running subcuticular sutures. The patient tolerated the procedure well.    Disposition  To recovery room, VS stable. capsule by mouth nightly for 7 days, THEN 1 capsule every afternoon and 1 capsule at bedtime.   • tiZANidine (ZANAFLEX) 2 MG tablet Take 1 tablet by mouth 2 times daily as needed for Muscle spasms (neck pain).   • norethindrone-ethinyl estradiol-iron (Junel FE 1.5/30) 1.5-30 MG-MCG tablet Take 1 tablet by mouth daily.   • mesalamine (LIALDA) 1.2 g EC tablet Take 2 tablets by mouth daily (with breakfast).   • butalbital-APAP-caffeine (FIORICET) -40 MG per tablet Take 1 tablet by mouth every 8 hours as needed for Pain or Headaches.   • Riboflavin (B-2-400 PO) Take 400 mg by mouth daily.   • clindamycin-benzoyl peroxide 1.2-5 % gel Apply each night to acne areas of face   • fluticasone (FLONASE) 50 MCG/ACT nasal spray Spray 2 sprays in each nostril daily.   • TURMERIC PO Take 1 tablet by mouth daily.    • Multiple Vitamins-Minerals (MULTIVITAMIN PO) Take 1 tablet by mouth daily.   • Cholecalciferol (VITAMIN D) 2000 units tablet Take 2,000 Units by mouth daily.      No current facility-administered medications for this visit.       PAST MEDICAL HISTORY:  The patient has a personal history of skin cancer  No        Ulcerative colitis (CMS/[facility])                                  Acid reflux disease                                           Overactive bladder                                            Vitamin D deficiency                                            Comment: mild    Tattoo                                                          Comment: left upper arm    Back pain                                                       Comment: chronic    Acne                                                          Acne                                                          Injury of right rotator cuff                    08/2021       Migraine                                                        DERMATOLOGY PAST MEDICAL HISTORY:      The patient was previously seen by Dr. Dominique Hou 5/25/12 for acne.   Acne treated  with Minocycline but that caused dizziness.  Patient was switched to Bactrim and Duac.  Currently on doxycycline and Duac.     Patient has ulcerative colitis managed by Dr. Calloway.     FAMILY HISTORY:  The patient has a family history of melanoma:  No    PHYSICAL EXAMINATION:    Sun damaged skin    On the chest, back, abdomen, arms, and legs there are light brown even colored and symmetric macules (Nevi)    On the chest, back, abdomen, arms, and legs there are light brown stuck on papules (Seborrheic keratosis)    Scattered bright red papules on trunk and extremities (cherry angiomas)    Under left breast 6 mm pink papule with overlying crust (wart vs other)       No other significant findings on examination from the waist up and legs, which is a detailed examination, including palpation and inspection of the scalp, hair, head, face, eyelids, lips, neck, chest (including breasts, underarms, eccrine, and apocrine glands), abdomen, back, and upper and lower extremities. The patient is well nourished and well developed.     The patient was seen and examined by Awilda Bernardo MD.  in the presence of my medical assistant  Robert Lopez MA .  This office visit note was in part created by Robert Lopez MA acting also as a scribe for Awilda Bernardo MD.   Awilda Bernardo MD    reviewed the note for accuracy and completeness before signing.        Assessment and Plan:    1.  Sun damaged skin (dermatoheliosis)    For management of this problem I recommend:  Wearing appropriate sun protection clothing.  We recommend the patient use the over the counter medication, Sunscreen with SPF 30.      2. Melanocytic nevi    We recommend the patient use the over the counter medication, Sunscreen with SPF 30 and wearing appropriate sun protective clothing.      3. Seborrheic keratosis (or keratoses)    The diagnosis was discussed.  Since the lesion(s)  is(are)  asymptomatic, no therapy is required at this time.       4. Cherry  angioma(s)    Patient reassured these are a common, benign vascular tumor that require no treatment unless symptomatic.         5. Shave removal  (Tangential excision) of 6 mm pink papule with overlying crust located under the left breast.    After discussion of risks and benefits of shave removal (tangential excision) , the patient gave oral consent for the same.  The area was cleansed with alcohol and the site was anesthetized with Xylocaine.  The lesion was removed with shave technique. The patient was instructed in wound care in oral form and written forms. The specimen was submitted for an [facility] dermatopathologist to read.  Diagnosis is neoplasm of uncertain behavior skin.    We took a photograph of this site to help verify the location and appearance of the lesion.  The patient gave verbal consent for me to take this photograph and enter this photograph into EPIC.    Differential diagnosis is:   Wart vs other          On 10/19/2022, Robert JERRY MA scribed the services personally performed by Awilda Bernardo MD   The documentation recorded by the scribe accurately and completely reflects the service(s) I personally performed and the decisions made by me.

## 2023-02-20 NOTE — ASU PREOP CHECKLIST - DENTURES
----- Message from Onelia Amaya sent at 1/18/2017  9:31 AM CST -----  Contact: Mom [phone]  Mom would like to speak to a nurse to in regards to pt weight. She would like to know pt's previous weight. Mom would also like to set up pt's portal. Please advise.  
Spoke with mom  Take wt check for 2 days ,call me  results  
no
Yes

## 2023-04-14 ENCOUNTER — INPATIENT (INPATIENT)
Facility: HOSPITAL | Age: 23
LOS: 4 days | Discharge: PSYCHIATRIC OR SUBSTANCE USE FACILITY | End: 2023-04-19
Attending: HOSPITALIST | Admitting: HOSPITALIST
Payer: COMMERCIAL

## 2023-04-14 VITALS
TEMPERATURE: 98 F | SYSTOLIC BLOOD PRESSURE: 121 MMHG | OXYGEN SATURATION: 98 % | HEART RATE: 94 BPM | DIASTOLIC BLOOD PRESSURE: 86 MMHG | RESPIRATION RATE: 18 BRPM

## 2023-04-14 LAB
ALBUMIN SERPL ELPH-MCNC: 4.6 G/DL — SIGNIFICANT CHANGE UP (ref 3.3–5)
ALP SERPL-CCNC: 61 U/L — SIGNIFICANT CHANGE UP (ref 40–120)
ALT FLD-CCNC: 10 U/L — SIGNIFICANT CHANGE UP (ref 4–41)
AMPHET UR-MCNC: POSITIVE
ANION GAP SERPL CALC-SCNC: 12 MMOL/L — SIGNIFICANT CHANGE UP (ref 7–14)
APAP SERPL-MCNC: 16 UG/ML — SIGNIFICANT CHANGE UP (ref 15–25)
AST SERPL-CCNC: 18 U/L — SIGNIFICANT CHANGE UP (ref 4–40)
BARBITURATES UR SCN-MCNC: NEGATIVE — SIGNIFICANT CHANGE UP
BASE EXCESS BLDV CALC-SCNC: 4.1 MMOL/L — HIGH (ref -2–3)
BASOPHILS # BLD AUTO: 0.03 K/UL — SIGNIFICANT CHANGE UP (ref 0–0.2)
BASOPHILS NFR BLD AUTO: 0.5 % — SIGNIFICANT CHANGE UP (ref 0–2)
BENZODIAZ UR-MCNC: POSITIVE
BILIRUB SERPL-MCNC: 0.2 MG/DL — SIGNIFICANT CHANGE UP (ref 0.2–1.2)
BLOOD GAS ARTERIAL COMPREHENSIVE RESULT: SIGNIFICANT CHANGE UP
BLOOD GAS VENOUS COMPREHENSIVE RESULT: SIGNIFICANT CHANGE UP
BUN SERPL-MCNC: 13 MG/DL — SIGNIFICANT CHANGE UP (ref 7–23)
CALCIUM SERPL-MCNC: 9.3 MG/DL — SIGNIFICANT CHANGE UP (ref 8.4–10.5)
CHLORIDE BLDV-SCNC: 107 MMOL/L — SIGNIFICANT CHANGE UP (ref 96–108)
CHLORIDE SERPL-SCNC: 105 MMOL/L — SIGNIFICANT CHANGE UP (ref 98–107)
CO2 BLDV-SCNC: 31.2 MMOL/L — HIGH (ref 22–26)
CO2 SERPL-SCNC: 25 MMOL/L — SIGNIFICANT CHANGE UP (ref 22–31)
COCAINE METAB.OTHER UR-MCNC: NEGATIVE — SIGNIFICANT CHANGE UP
CREAT SERPL-MCNC: 1.05 MG/DL — SIGNIFICANT CHANGE UP (ref 0.5–1.3)
CREATININE URINE RESULT, DAU: 454 MG/DL — SIGNIFICANT CHANGE UP
EGFR: 102 ML/MIN/1.73M2 — SIGNIFICANT CHANGE UP
EOSINOPHIL # BLD AUTO: 0.09 K/UL — SIGNIFICANT CHANGE UP (ref 0–0.5)
EOSINOPHIL NFR BLD AUTO: 1.4 % — SIGNIFICANT CHANGE UP (ref 0–6)
ETHANOL SERPL-MCNC: <10 MG/DL — SIGNIFICANT CHANGE UP
GAS PNL BLDV: 141 MMOL/L — SIGNIFICANT CHANGE UP (ref 136–145)
GLUCOSE BLDV-MCNC: 101 MG/DL — HIGH (ref 70–99)
GLUCOSE SERPL-MCNC: 109 MG/DL — HIGH (ref 70–99)
HCO3 BLDV-SCNC: 30 MMOL/L — HIGH (ref 22–29)
HCT VFR BLD CALC: 37.9 % — LOW (ref 39–50)
HCT VFR BLDA CALC: 37 % — LOW (ref 39–51)
HGB BLD CALC-MCNC: 12.3 G/DL — LOW (ref 12.6–17.4)
HGB BLD-MCNC: 11.7 G/DL — LOW (ref 13–17)
IANC: 3.46 K/UL — SIGNIFICANT CHANGE UP (ref 1.8–7.4)
IMM GRANULOCYTES NFR BLD AUTO: 0.3 % — SIGNIFICANT CHANGE UP (ref 0–0.9)
LACTATE BLDV-MCNC: 1.6 MMOL/L — SIGNIFICANT CHANGE UP (ref 0.5–2)
LYMPHOCYTES # BLD AUTO: 2.25 K/UL — SIGNIFICANT CHANGE UP (ref 1–3.3)
LYMPHOCYTES # BLD AUTO: 36.2 % — SIGNIFICANT CHANGE UP (ref 13–44)
MAGNESIUM SERPL-MCNC: 2.1 MG/DL — SIGNIFICANT CHANGE UP (ref 1.6–2.6)
MCHC RBC-ENTMCNC: 25.8 PG — LOW (ref 27–34)
MCHC RBC-ENTMCNC: 30.9 GM/DL — LOW (ref 32–36)
MCV RBC AUTO: 83.5 FL — SIGNIFICANT CHANGE UP (ref 80–100)
METHADONE UR-MCNC: NEGATIVE — SIGNIFICANT CHANGE UP
MONOCYTES # BLD AUTO: 0.36 K/UL — SIGNIFICANT CHANGE UP (ref 0–0.9)
MONOCYTES NFR BLD AUTO: 5.8 % — SIGNIFICANT CHANGE UP (ref 2–14)
NEUTROPHILS # BLD AUTO: 3.46 K/UL — SIGNIFICANT CHANGE UP (ref 1.8–7.4)
NEUTROPHILS NFR BLD AUTO: 55.8 % — SIGNIFICANT CHANGE UP (ref 43–77)
NRBC # BLD: 0 /100 WBCS — SIGNIFICANT CHANGE UP (ref 0–0)
NRBC # FLD: 0 K/UL — SIGNIFICANT CHANGE UP (ref 0–0)
OPIATES UR-MCNC: NEGATIVE — SIGNIFICANT CHANGE UP
OXYCODONE UR-MCNC: POSITIVE
PCO2 BLDV: 48 MMHG — SIGNIFICANT CHANGE UP (ref 42–55)
PCP SPEC-MCNC: SIGNIFICANT CHANGE UP
PCP UR-MCNC: NEGATIVE — SIGNIFICANT CHANGE UP
PH BLDV: 7.4 — SIGNIFICANT CHANGE UP (ref 7.32–7.43)
PLATELET # BLD AUTO: 261 K/UL — SIGNIFICANT CHANGE UP (ref 150–400)
PO2 BLDV: 61 MMHG — HIGH (ref 25–45)
POTASSIUM BLDV-SCNC: 3.3 MMOL/L — LOW (ref 3.5–5.1)
POTASSIUM SERPL-MCNC: 3.4 MMOL/L — LOW (ref 3.5–5.3)
POTASSIUM SERPL-SCNC: 3.4 MMOL/L — LOW (ref 3.5–5.3)
PROT SERPL-MCNC: 6.9 G/DL — SIGNIFICANT CHANGE UP (ref 6–8.3)
RBC # BLD: 4.54 M/UL — SIGNIFICANT CHANGE UP (ref 4.2–5.8)
RBC # FLD: 15.7 % — HIGH (ref 10.3–14.5)
SALICYLATES SERPL-MCNC: 5.9 MG/DL — LOW (ref 15–30)
SAO2 % BLDV: 90.9 % — HIGH (ref 67–88)
SODIUM SERPL-SCNC: 142 MMOL/L — SIGNIFICANT CHANGE UP (ref 135–145)
THC UR QL: NEGATIVE — SIGNIFICANT CHANGE UP
TOXICOLOGY SCREEN, DRUGS OF ABUSE, SERUM RESULT: SIGNIFICANT CHANGE UP
TROPONIN T, HIGH SENSITIVITY RESULT: 13 NG/L — SIGNIFICANT CHANGE UP
WBC # BLD: 6.21 K/UL — SIGNIFICANT CHANGE UP (ref 3.8–10.5)
WBC # FLD AUTO: 6.21 K/UL — SIGNIFICANT CHANGE UP (ref 3.8–10.5)

## 2023-04-14 PROCEDURE — 99291 CRITICAL CARE FIRST HOUR: CPT | Mod: 25

## 2023-04-14 PROCEDURE — 71045 X-RAY EXAM CHEST 1 VIEW: CPT | Mod: 26

## 2023-04-14 PROCEDURE — 31500 INSERT EMERGENCY AIRWAY: CPT

## 2023-04-14 RX ORDER — ROCURONIUM BROMIDE 10 MG/ML
80 VIAL (ML) INTRAVENOUS ONCE
Refills: 0 | Status: COMPLETED | OUTPATIENT
Start: 2023-04-14 | End: 2023-04-14

## 2023-04-14 RX ORDER — ETOMIDATE 2 MG/ML
30 INJECTION INTRAVENOUS ONCE
Refills: 0 | Status: COMPLETED | OUTPATIENT
Start: 2023-04-14 | End: 2023-04-14

## 2023-04-14 RX ORDER — PROPOFOL 10 MG/ML
50 INJECTION, EMULSION INTRAVENOUS
Qty: 1000 | Refills: 0 | Status: DISCONTINUED | OUTPATIENT
Start: 2023-04-14 | End: 2023-04-15

## 2023-04-14 RX ORDER — CHLORHEXIDINE GLUCONATE 213 G/1000ML
15 SOLUTION TOPICAL EVERY 12 HOURS
Refills: 0 | Status: DISCONTINUED | OUTPATIENT
Start: 2023-04-14 | End: 2023-04-15

## 2023-04-14 RX ORDER — NALOXONE HYDROCHLORIDE 4 MG/.1ML
2 SPRAY NASAL ONCE
Refills: 0 | Status: COMPLETED | OUTPATIENT
Start: 2023-04-14 | End: 2023-04-14

## 2023-04-14 RX ORDER — SODIUM CHLORIDE 9 MG/ML
1000 INJECTION INTRAMUSCULAR; INTRAVENOUS; SUBCUTANEOUS ONCE
Refills: 0 | Status: COMPLETED | OUTPATIENT
Start: 2023-04-14 | End: 2023-04-14

## 2023-04-14 RX ADMIN — Medication 80 MILLIGRAM(S): at 21:50

## 2023-04-14 RX ADMIN — PROPOFOL 19.5 MICROGRAM(S)/KG/MIN: 10 INJECTION, EMULSION INTRAVENOUS at 22:33

## 2023-04-14 RX ADMIN — ETOMIDATE 30 MILLIGRAM(S): 2 INJECTION INTRAVENOUS at 21:50

## 2023-04-14 RX ADMIN — SODIUM CHLORIDE 1000 MILLILITER(S): 9 INJECTION INTRAMUSCULAR; INTRAVENOUS; SUBCUTANEOUS at 22:33
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
## 2023-04-14 NOTE — H&P ADULT - ASSESSMENT
22 y/o M w/ a PMHx of gastric sleeve surgery, ADHD, bipolar d/o, depression who p/w intentional OD of oxycodone/tylenol and xanax at 4/14 8:45 pm for a suicide attempt. He ingested oxycodone/tylenol 325mg/5mg x 40 and xanax (dosage unknown) x20.   intubated for airway protection, admitted to the MICU    =====Neuro=====  # sedation  - prop    # intentional drug overdose  # suicide attempts  # OD of opiate  # OD of tylenol  # OD of benzo  intubated for airway protection. salicylate level & alcohol level negative. Initial acetaminophen level at 16.   urine tox screen positive for  amphetamine, benzo, oxycodone (expected)  -  Tox consulted, appreciate recs  -  no gastric decontamination due to history of gastric sleeve  -  F/U 4-hour acetaminophen level.  if elevated, will require N-acetylcysteine therapy  -  no further treatment required for opiate and benzo overdose beside supportive therapy  -  trend CMP    #ADHD  # bipolar disorder  # depression  # SI  - hold home Adderall  - psych consult once extubated and wean off sedation  - SIBRT consult once extubated and wean off sedation  - 1:1 constant observation once extubated and wean off sedation    =====CV=====  # No active issue    =====Resp=====  # vent setting   minimal vent setting, for airway protection.  No intrinsic lung issue    =====GI=====  # Hx of gastric sleeve  # vomiting  - NBNB  vomiting per witness on scene  - likely in setting of acute  ingestion  - Protonix 40 mg IV once daily  - NPO    # Acetaminophen toxicity  - f/u 4 hours  acetaminophen level  - trend CMP    =====Renal=====  # no active issue  -  monitor urine output  -  replete lites as necessary    =====Heme=====  #  normocytic anemia  -  iron study on floor    # DVT PPX  - lovenox 40 mg    =====ID=====  # No active issue    =====Endo=====  # No active issue    =====Skin=====  # No active issue    =====Social/Ethics=====  - Code status: full code

## 2023-04-14 NOTE — ED PROVIDER NOTE - PROGRESS NOTE DETAILS
Brooks Arauz PGY-3  spoke with tox, no GI contamination at this time. plan for 4 hour tylenol level. likely MICU admission, pending full recs Mariusz IQBAL (PGY-3)  received s/o from day team pending labs and micu evaluation. post intubation ABG showing adequate vent settings. micu came to eval pt and will accept admission. order placed for 1AM tylenol level

## 2023-04-14 NOTE — H&P ADULT - NSHPLABSRESULTS_GEN_ALL_CORE
LABS:                         11.7   6.21  )-----------( 261      ( 14 Apr 2023 22:13 )             37.9     04-14    142  |  105  |  13  ----------------------------<  109<H>  3.4<L>   |  25  |  1.05    Ca    9.3      14 Apr 2023 22:13  Mg     2.10     04-14    TPro  6.9  /  Alb  4.6  /  TBili  0.2  /  DBili  x   /  AST  18  /  ALT  10  /  AlkPhos  61  04-14      CXR:  clear lung, ET tube in good position                RADIOLOGY, EKG & ADDITIONAL TESTS:
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
## 2023-04-14 NOTE — ED PROVIDER NOTE - OTHER FINDINGS
NSR rate of 86.No AV blocks. narrow QRS and no Bundle Branch Blocks. No STEs, TWIs, Wellen's Brugada, no Delta Waves. QTc of 418

## 2023-04-14 NOTE — ED PROCEDURE NOTE - ATTENDING CONTRIBUTION TO CARE
I was present for and supervised and assisted the key portions of the procedure and agree with resident's note as written with any of the following additions below:  Willian Reilly MSBS-MS, MD  Internal/Emergency/Critical Care Medicine
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
## 2023-04-14 NOTE — ED PROVIDER NOTE - PHYSICAL EXAMINATION
PHYSICAL EXAM prior to intubation)  GENERAL: pt vomiting and lethargic but some improvement after narcan, thin appearing man   HEAD: Atraumatic, no villareal's sign, no periorbital ecchymosis   EYES: PERRL, EOMs intact b/l w/out deficits  ENMT: copious secretions in airway  CHEST/LUNG: CTAB no wheezes/rhonchi/rales  HEART: RRR no murmur/gallops/rubs  ABDOMEN: +BS, soft, nontender to palpation  EXTREMITIES: No LE edema, +2 radial pulses b/l  NERVOUS SYSTEM:  A&Ox4, No motor deficits or sensory deficits to b/l UEs  Heme/LYMPH: No ecchymosis or bruising or LAD  SKIN:  No new rashes or DTIs, numerous tattoos on upper body and arms

## 2023-04-14 NOTE — ED PROVIDER NOTE - CLINICAL SUMMARY MEDICAL DECISION MAKING FREE TEXT BOX
Pt is a 24 y/o M w/ a PMHx of gastric sleeve surgery, ADHD, bipolar d/o, depression who p/w intentional OD and altered mental status and profuse vomiting now s/p intubation for airway protection and consulted MICU and tox and no role for GI decontamination at this time. Normal EKG at this time with low suspicion for other ingestion  Will get CBC, CMP, Coags, Serum Tox screen except Tylenol level which will be drawn at 12:45-1am   Which Tylenol level back if >150 will start on NAC per tox and will check Q4H  Check ABG not that on mechanical ventilator and adjust accordingly  Sedation with propofol for now  Check CXR  Will need admission to MICU

## 2023-04-14 NOTE — H&P ADULT - ATTENDING COMMENTS
24 yo M PMH gastric sleeve surgery, ADHD, bipolar disorder, and depression presenting for suicide attempt with ingestion of oxycodone, tylenol, and xanax. Intubated for airway protection.     - on propofol for sedation, wean as tolerated with goal RASS -2 to 0  - avoid benzos and opiates  - gastric decontamination deferred 2/2 gastric sleeve   - NAC therapy pending follow up acetaminophen level  - follow up CMP and electrolytes  - psych consult and 1:1 when awake    Brando Motley MD

## 2023-04-14 NOTE — ED ADULT NURSE NOTE - NS ED NOTE ABUSE RESPONSE YN
[facility]   Operative Note    Pre-operative diagnosis: Fecalith (H) [K56.41]  Intra-abdominal abscess (H) [K65.1]   Post-operative diagnosis Same-retained fecalith    Procedure: Procedure(s):  exploratory laparoscopy with lysis of adhesions, drainage of abscess   Surgeon(s): Surgeon(s) and Role:     * Albert Fernandez MD - Primary     * Meli Hook APRN CNS - Assisting   Estimated blood loss: 10 mL    Specimens: * No specimens in log *   Findings:  There was an abscess cavity with a fecalith, within this was crushed and sucked up through suction. Abdomen was irrigated out and large drain left in RLQ.      Description of procedure:   After confirming consent the patient was brought to the operating room placed supine on the table and general anesthesia was administered. The abdomen was prepped and draped in a sterile fashion including the drain after being capped. Then a timeout was performed for patient safety. Using a 5mm optivue trocar the abdomen was entered under direct visualization. Looking throughout the abdomen there were some mile omental adhesions to the abdominal wall. Another 5 mm port was placed and the adhesions were taken down bluntly. A third 5mm port was then placed in the infraumbilical region. Then noted to have the cecum adherent to the abdominal wall. The drain was not visible. Therefore it was removed.  The terminal ileum was identified coursing inferior lateral to the cecum. A combination of blunt dissection and small amount of liga sure, the abscess cavity was entered. There was noted to be a fecalith in the cavity, this broke up while being removed and was suctioned up. the staple line from the appendectomy was identified. There was no obvious dehiscence and prior sinograms showed no communication with abscess and bowel. So the cavity was washed out. The cavity was copiously irrigated with saline. A round george drain was then introduced into the right lower quadrant and  pulled out through the infraumbilical port site and secured with a stitch. The supraumbilical port site fascia was closed. All ports were removed under direct visualization. The old drain site was packed open. All other incisions were closed.     MD Margret Wick actively first assisted during this operation providing necessary retraction and exposure as well as maintaining hemostasis and a clear operative field. This was helpful in allowing the operation to proceed in a safe and expeditious manner. She was present for the entire duration of the operation.        Unable to assess due to medical condition

## 2023-04-14 NOTE — CONSULT NOTE ADULT - ASSESSMENT
Patient is a 23 year old male with past medical history significant for depression and bipolar disorder presents after an ingestion.      1. Ingestion  - patient ingested combination of alprazolam and acetaminophen-oxycodone tablets 1-2 hours prior to presentation  - patient exhibited elements of opioid and sedative-hypnotic toxidrome upon presentation  - patient intubated for airway protection  - follow-up EKG, labs, salicylate level, alcohol level, and 4-hour acetaminophen level  - if 4-hour acetaminophen level is greater than 150, will require N-acetylcysteine therapy consisting of loading dose of 150mg/kg over 1 hour, followed by 50mg/kg over 4 hours, then 100mg/kg over 16 hours with repeat CMP and acetaminophen level 2 hours prior to finishing 16-hour treatment to assess the need for continued therapy.  if the acetaminophen level remains elevated or there is an increase in LFTs, then he will require additional N-acetylcysteine until the acetaminophen level is undetectable and the LFTs are at 50% of peak and less than 1000.

## 2023-04-14 NOTE — H&P ADULT - NSHPPHYSICALEXAM_GEN_ALL_CORE
GENERAL:  intubated and sedated   EYES:  pupil equal round and reactive to light. Anicteric.   HENT: Moist mucous membranes.   RESP:  intubated, CTAB   CARDIOVASCULAR: RRR, no m/g/r  ABDOMEN: soft, non distended, nttp  MSK: ROM grossly normal in all 4 extremities. No deformities  SKIN: warm and dry,  large amount of tattoo  NEUROLOGIC:  intubated and sedated  PSYCHIATRIC:  sedated

## 2023-04-14 NOTE — ED ADULT NURSE NOTE - NSIMPLEMENTINTERV_GEN_ALL_ED
Implemented All Fall Risk Interventions:  [place] to call system. Call bell, personal items and telephone within reach. Instruct patient to call for assistance. Room bathroom lighting operational. Non-slip footwear when patient is off stretcher. Physically safe environment: no spills, clutter or unnecessary equipment. Stretcher in lowest position, wheels locked, appropriate side rails in place. Provide visual cue, wrist band, yellow gown, etc. Monitor gait and stability. Monitor for mental status changes and reorient to person, place, and time. Review medications for side effects contributing to fall risk. Reinforce activity limits and safety measures with patient and family.

## 2023-04-14 NOTE — CONSULT NOTE ADULT - SUBJECTIVE AND OBJECTIVE BOX
MEDICAL TOXICOLOGY CONSULT    HPI: Patient is a 23 year old male with past medical history significant for depression and bipolar disorder presents after an ingestion.  Patient ingested approximately 20 tablets of alprazolam and 40 tablets of Percocet 1-2 hours prior to presentation.  Patient became increasingly lethargic and was not protecting his airway.  Patient intubated in the Emergency Department.  Medical toxicology consulted for further recommendations.    PAST MEDICAL & SURGICAL HISTORY:  Depression      ADHD (attention deficit hyperactivity disorder)      Bipolar disorder      Asthma      Mood disorder      Morbid obesity      No significant past surgical history          MEDICATION HISTORY:  chlorhexidine 0.12% Liquid 15 milliLiter(s) Oral Mucosa every 12 hours  etomidate Injectable 30 milliGRAM(s) IV Push once  propofol Infusion 50 MICROgram(s)/kG/Min IV Continuous <Continuous>  rocuronium Injectable 80 milliGRAM(s) IV Push once  sodium chloride 0.9% Bolus 1000 milliLiter(s) IV Bolus once      FAMILY HISTORY:  FH: HTN (hypertension)      Vital Signs Last 24 Hrs  T(C): 36.9 (14 Apr 2023 21:32), Max: 36.9 (14 Apr 2023 21:32)  T(F): 98.4 (14 Apr 2023 21:32), Max: 98.4 (14 Apr 2023 21:32)  HR: 94 (14 Apr 2023 21:32) (94 - 94)  BP: 121/86 (14 Apr 2023 21:32) (121/86 - 121/86)  BP(mean): --  RR: 18 (14 Apr 2023 21:32) (18 - 18)  SpO2: 98% (14 Apr 2023 21:32) (98% - 98%)    Parameters below as of 14 Apr 2023 21:32  Patient On (Oxygen Delivery Method): nasal cannula  O2 Flow (L/min): 2      SIGNIFICANT LABORATORY STUDIES:

## 2023-04-14 NOTE — ED PROVIDER NOTE - CRITICAL CARE ATTENDING CONTRIBUTION TO CARE
I personally saw the patient with the Resident, and completed the key components of the history and physical exam. I completed over 90% of the documentation including the HPI, ROS, PE, and MDM and I then discussed the management plan with the Resident.    Critical care management;  Mechanical vent management  Sedation drip management/titrate  Coordination of care with other services    -AGATA Stanford-MS, MD  Internal/Emergency/Critical Medicine I personally saw the patient with the Resident, and completed the key components of the history and physical exam. I completed over 90% of the documentation including the HPI, ROS, PE, and MDM and I then discussed the management plan with the Resident.    Critical care management;  Mechanical vent management  Sedation drip management/titrate  Coordination of care with other services    -AGATA Stanford-MS, MD  Internal/Emergency/Critical Medicine.
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
## 2023-04-15 ENCOUNTER — OUTPATIENT (OUTPATIENT)
Dept: OUTPATIENT SERVICES | Facility: HOSPITAL | Age: 23
LOS: 1 days | Discharge: HOME | End: 2023-04-15

## 2023-04-15 DIAGNOSIS — D72.819 DECREASED WHITE BLOOD CELL COUNT, UNSPECIFIED: ICD-10-CM

## 2023-04-15 DIAGNOSIS — R04.0 EPISTAXIS: ICD-10-CM

## 2023-04-15 DIAGNOSIS — T50.902A POISONING BY UNSPECIFIED DRUGS, MEDICAMENTS AND BIOLOGICAL SUBSTANCES, INTENTIONAL SELF-HARM, INITIAL ENCOUNTER: ICD-10-CM

## 2023-04-15 LAB
ALBUMIN SERPL ELPH-MCNC: 4 G/DL — SIGNIFICANT CHANGE UP (ref 3.3–5)
ALP SERPL-CCNC: 55 U/L — SIGNIFICANT CHANGE UP (ref 40–120)
ALT FLD-CCNC: 9 U/L — SIGNIFICANT CHANGE UP (ref 4–41)
ANION GAP SERPL CALC-SCNC: 13 MMOL/L — SIGNIFICANT CHANGE UP (ref 7–14)
APAP SERPL-MCNC: 22 UG/ML — SIGNIFICANT CHANGE UP (ref 15–25)
APTT BLD: 31.1 SEC — SIGNIFICANT CHANGE UP (ref 27–36.3)
AST SERPL-CCNC: 17 U/L — SIGNIFICANT CHANGE UP (ref 4–40)
BASE EXCESS BLDV CALC-SCNC: 3.6 MMOL/L — HIGH (ref -2–3)
BASOPHILS # BLD AUTO: 0.02 K/UL — SIGNIFICANT CHANGE UP (ref 0–0.2)
BASOPHILS NFR BLD AUTO: 0.4 % — SIGNIFICANT CHANGE UP (ref 0–2)
BILIRUB SERPL-MCNC: 0.4 MG/DL — SIGNIFICANT CHANGE UP (ref 0.2–1.2)
BLD GP AB SCN SERPL QL: NEGATIVE — SIGNIFICANT CHANGE UP
BLOOD GAS VENOUS COMPREHENSIVE RESULT: SIGNIFICANT CHANGE UP
BUN SERPL-MCNC: 14 MG/DL — SIGNIFICANT CHANGE UP (ref 7–23)
CALCIUM SERPL-MCNC: 9.1 MG/DL — SIGNIFICANT CHANGE UP (ref 8.4–10.5)
CHLORIDE BLDV-SCNC: 107 MMOL/L — SIGNIFICANT CHANGE UP (ref 96–108)
CHLORIDE SERPL-SCNC: 106 MMOL/L — SIGNIFICANT CHANGE UP (ref 98–107)
CO2 BLDV-SCNC: 30.5 MMOL/L — HIGH (ref 22–26)
CO2 SERPL-SCNC: 25 MMOL/L — SIGNIFICANT CHANGE UP (ref 22–31)
CREAT SERPL-MCNC: 1.12 MG/DL — SIGNIFICANT CHANGE UP (ref 0.5–1.3)
EGFR: 94 ML/MIN/1.73M2 — SIGNIFICANT CHANGE UP
EOSINOPHIL # BLD AUTO: 0.14 K/UL — SIGNIFICANT CHANGE UP (ref 0–0.5)
EOSINOPHIL NFR BLD AUTO: 2.7 % — SIGNIFICANT CHANGE UP (ref 0–6)
FLUAV AG NPH QL: SIGNIFICANT CHANGE UP
FLUBV AG NPH QL: SIGNIFICANT CHANGE UP
GAS PNL BLDV: 141 MMOL/L — SIGNIFICANT CHANGE UP (ref 136–145)
GLUCOSE BLDV-MCNC: 82 MG/DL — SIGNIFICANT CHANGE UP (ref 70–99)
GLUCOSE SERPL-MCNC: 85 MG/DL — SIGNIFICANT CHANGE UP (ref 70–99)
HCO3 BLDV-SCNC: 29 MMOL/L — SIGNIFICANT CHANGE UP (ref 22–29)
HCT VFR BLD CALC: 35.9 % — LOW (ref 39–50)
HCT VFR BLDA CALC: 33 % — LOW (ref 39–51)
HGB BLD CALC-MCNC: 11 G/DL — LOW (ref 12.6–17.4)
HGB BLD-MCNC: 11.1 G/DL — LOW (ref 13–17)
IANC: 2.39 K/UL — SIGNIFICANT CHANGE UP (ref 1.8–7.4)
IMM GRANULOCYTES NFR BLD AUTO: 0 % — SIGNIFICANT CHANGE UP (ref 0–0.9)
INR BLD: 1.2 RATIO — HIGH (ref 0.88–1.16)
LACTATE BLDV-MCNC: 1 MMOL/L — SIGNIFICANT CHANGE UP (ref 0.5–2)
LYMPHOCYTES # BLD AUTO: 2.17 K/UL — SIGNIFICANT CHANGE UP (ref 1–3.3)
LYMPHOCYTES # BLD AUTO: 41.7 % — SIGNIFICANT CHANGE UP (ref 13–44)
MAGNESIUM SERPL-MCNC: 2.2 MG/DL — SIGNIFICANT CHANGE UP (ref 1.6–2.6)
MCHC RBC-ENTMCNC: 26.4 PG — LOW (ref 27–34)
MCHC RBC-ENTMCNC: 30.9 GM/DL — LOW (ref 32–36)
MCV RBC AUTO: 85.3 FL — SIGNIFICANT CHANGE UP (ref 80–100)
MONOCYTES # BLD AUTO: 0.48 K/UL — SIGNIFICANT CHANGE UP (ref 0–0.9)
MONOCYTES NFR BLD AUTO: 9.2 % — SIGNIFICANT CHANGE UP (ref 2–14)
NEUTROPHILS # BLD AUTO: 2.39 K/UL — SIGNIFICANT CHANGE UP (ref 1.8–7.4)
NEUTROPHILS NFR BLD AUTO: 46 % — SIGNIFICANT CHANGE UP (ref 43–77)
NRBC # BLD: 0 /100 WBCS — SIGNIFICANT CHANGE UP (ref 0–0)
NRBC # FLD: 0 K/UL — SIGNIFICANT CHANGE UP (ref 0–0)
PCO2 BLDV: 47 MMHG — SIGNIFICANT CHANGE UP (ref 42–55)
PH BLDV: 7.4 — SIGNIFICANT CHANGE UP (ref 7.32–7.43)
PHOSPHATE SERPL-MCNC: 4.6 MG/DL — HIGH (ref 2.5–4.5)
PLATELET # BLD AUTO: 222 K/UL — SIGNIFICANT CHANGE UP (ref 150–400)
PO2 BLDV: 53 MMHG — HIGH (ref 25–45)
POTASSIUM BLDV-SCNC: 3.2 MMOL/L — LOW (ref 3.5–5.1)
POTASSIUM SERPL-MCNC: 3.4 MMOL/L — LOW (ref 3.5–5.3)
POTASSIUM SERPL-SCNC: 3.4 MMOL/L — LOW (ref 3.5–5.3)
PROT SERPL-MCNC: 6.2 G/DL — SIGNIFICANT CHANGE UP (ref 6–8.3)
PROTHROM AB SERPL-ACNC: 13.9 SEC — HIGH (ref 10.5–13.4)
RBC # BLD: 4.21 M/UL — SIGNIFICANT CHANGE UP (ref 4.2–5.8)
RBC # FLD: 15.8 % — HIGH (ref 10.3–14.5)
RH IG SCN BLD-IMP: POSITIVE — SIGNIFICANT CHANGE UP
RSV RNA NPH QL NAA+NON-PROBE: SIGNIFICANT CHANGE UP
SAO2 % BLDV: 86.7 % — SIGNIFICANT CHANGE UP (ref 67–88)
SARS-COV-2 RNA SPEC QL NAA+PROBE: SIGNIFICANT CHANGE UP
SODIUM SERPL-SCNC: 144 MMOL/L — SIGNIFICANT CHANGE UP (ref 135–145)
WBC # BLD: 5.2 K/UL — SIGNIFICANT CHANGE UP (ref 3.8–10.5)
WBC # FLD AUTO: 5.2 K/UL — SIGNIFICANT CHANGE UP (ref 3.8–10.5)

## 2023-04-15 PROCEDURE — 90792 PSYCH DIAG EVAL W/MED SRVCS: CPT

## 2023-04-15 PROCEDURE — 99291 CRITICAL CARE FIRST HOUR: CPT | Mod: GC

## 2023-04-15 PROCEDURE — 71045 X-RAY EXAM CHEST 1 VIEW: CPT | Mod: 26,76

## 2023-04-15 RX ORDER — CHLORHEXIDINE GLUCONATE 213 G/1000ML
1 SOLUTION TOPICAL
Refills: 0 | Status: DISCONTINUED | OUTPATIENT
Start: 2023-04-15 | End: 2023-04-16

## 2023-04-15 RX ORDER — ENOXAPARIN SODIUM 100 MG/ML
40 INJECTION SUBCUTANEOUS EVERY 24 HOURS
Refills: 0 | Status: DISCONTINUED | OUTPATIENT
Start: 2023-04-15 | End: 2023-04-19

## 2023-04-15 RX ORDER — PANTOPRAZOLE SODIUM 20 MG/1
40 TABLET, DELAYED RELEASE ORAL DAILY
Refills: 0 | Status: DISCONTINUED | OUTPATIENT
Start: 2023-04-15 | End: 2023-04-19

## 2023-04-15 RX ORDER — SODIUM CHLORIDE 9 MG/ML
1000 INJECTION, SOLUTION INTRAVENOUS
Refills: 0 | Status: DISCONTINUED | OUTPATIENT
Start: 2023-04-15 | End: 2023-04-15

## 2023-04-15 RX ORDER — POTASSIUM CHLORIDE 20 MEQ
10 PACKET (EA) ORAL
Refills: 0 | Status: COMPLETED | OUTPATIENT
Start: 2023-04-15 | End: 2023-04-15

## 2023-04-15 RX ORDER — LANOLIN ALCOHOL/MO/W.PET/CERES
6 CREAM (GRAM) TOPICAL ONCE
Refills: 0 | Status: COMPLETED | OUTPATIENT
Start: 2023-04-15 | End: 2023-04-15

## 2023-04-15 RX ORDER — ONDANSETRON 8 MG/1
4 TABLET, FILM COATED ORAL EVERY 8 HOURS
Refills: 0 | Status: DISCONTINUED | OUTPATIENT
Start: 2023-04-15 | End: 2023-04-19

## 2023-04-15 RX ORDER — DIPHENHYDRAMINE HCL 50 MG
12.5 CAPSULE ORAL ONCE
Refills: 0 | Status: COMPLETED | OUTPATIENT
Start: 2023-04-15 | End: 2023-04-15

## 2023-04-15 RX ADMIN — PANTOPRAZOLE SODIUM 40 MILLIGRAM(S): 20 TABLET, DELAYED RELEASE ORAL at 12:18

## 2023-04-15 RX ADMIN — Medication 100 MILLIEQUIVALENT(S): at 03:30

## 2023-04-15 RX ADMIN — Medication 100 MILLIEQUIVALENT(S): at 02:30

## 2023-04-15 RX ADMIN — Medication 12.5 MILLIGRAM(S): at 22:21

## 2023-04-15 RX ADMIN — CHLORHEXIDINE GLUCONATE 15 MILLILITER(S): 213 SOLUTION TOPICAL at 05:12

## 2023-04-15 RX ADMIN — CHLORHEXIDINE GLUCONATE 1 APPLICATION(S): 213 SOLUTION TOPICAL at 05:13

## 2023-04-15 RX ADMIN — ENOXAPARIN SODIUM 40 MILLIGRAM(S): 100 INJECTION SUBCUTANEOUS at 12:19

## 2023-04-15 RX ADMIN — Medication 100 MILLIEQUIVALENT(S): at 05:12

## 2023-04-15 RX ADMIN — NALOXONE HYDROCHLORIDE 2 MILLIGRAM(S): 4 SPRAY NASAL at 00:42

## 2023-04-15 NOTE — BH CONSULTATION LIAISON ASSESSMENT NOTE - CURRENT MEDICATION
MEDICATIONS  (STANDING):  chlorhexidine 0.12% Liquid 15 milliLiter(s) Oral Mucosa every 12 hours  chlorhexidine 2% Cloths 1 Application(s) Topical <User Schedule>  enoxaparin Injectable 40 milliGRAM(s) SubCutaneous every 24 hours  pantoprazole  Injectable 40 milliGRAM(s) IV Push daily  propofol Infusion 50 MICROgram(s)/kG/Min (19.5 mL/Hr) IV Continuous <Continuous>    MEDICATIONS  (PRN):

## 2023-04-15 NOTE — PROGRESS NOTE ADULT - ASSESSMENT
24 y/o M w/ a PMHx of gastric sleeve surgery, ADHD, bipolar d/o, depression who p/w intentional OD of oxycodone/tylenol and xanax at 4/14 8:45 pm for a suicide attempt. He ingested oxycodone/tylenol 325mg/5mg x 40 and xanax (dosage unknown) x20.   intubated for airway protection, admitted to the MICU    =====Neuro=====  # sedation  - prop    # intentional drug overdose  # suicide attempts  # OD of opiate  # OD of tylenol  # OD of benzo  intubated for airway protection. salicylate level & alcohol level negative. Initial acetaminophen level at 16.   urine tox screen positive for  amphetamine, benzo, oxycodone (expected)  -  Tox consulted, appreciate recs  -  no gastric decontamination due to history of gastric sleeve  -  F/U 4-hour acetaminophen level.  if elevated, will require N-acetylcysteine therapy  -  no further treatment required for opiate and benzo overdose beside supportive therapy  -  trend CMP    #ADHD  # bipolar disorder  # depression  # SI  - hold home Adderall  - psych consult once extubated and wean off sedation  - SIBRT consult once extubated and wean off sedation  - 1:1 constant observation once extubated and wean off sedation    =====CV=====  # No active issue    =====Resp=====  # vent setting   minimal vent setting, for airway protection.  No intrinsic lung issue    =====GI=====  # Hx of gastric sleeve  # vomiting  - NBNB  vomiting per witness on scene  - likely in setting of acute  ingestion  - Protonix 40 mg IV once daily  - NPO    # Acetaminophen toxicity  - f/u 4 hours  acetaminophen level  - trend CMP    =====Renal=====  # no active issue  -  monitor urine output  -  replete lites as necessary    =====Heme=====  #  normocytic anemia  -  iron study on floor    # DVT PPX  - lovenox 40 mg    =====ID=====  # No active issue    =====Endo=====  # No active issue    =====Skin=====  # No active issue    =====Social/Ethics=====  - Code status: full code 22 y/o M w/ a PMHx of gastric sleeve surgery, ADHD, bipolar d/o, depression who p/w intentional OD of oxycodone/tylenol and xanax at 4/14 8:45 pm for a suicide attempt. He ingested oxycodone/tylenol 325mg/5mg x 40 and xanax (dosage unknown) x20.   intubated for airway protection, admitted to the MICU    =====Neuro=====  # sedation  - prop    # intentional drug overdose  # suicide attempts  # OD of opiate  # OD of tylenol  # OD of benzo  intubated for airway protection  - salicylate level & alcohol level negative. Initial acetaminophen level at 16.   - urine tox screen positive for  amphetamine, benzo, oxycodone (expected)  - no gastric decontamination due to history of gastric sleeve  - no further treatment required for opiate and benzo overdose beside supportive therapy  > appreciate tox recs  > trend CMP    #ADHD  # bipolar disorder  # depression  # SI  > hold home Adderall  > psych consult once extubated and wean off sedation  > SBIRT consult once extubated and wean off sedation  > 1:1 constant observation once extubated and wean off sedation    =====CV=====  # No active issue  > EKG    =====Resp=====  # vent setting  - minimal vent setting, for airway protection.  No intrinsic lung issue    =====GI=====  # Hx of gastric sleeve  # vomiting  - NBNB vomiting per witness on scene  - likely in setting of acute ingestion  > Protonix 40 mg IV once daily  > NPO    =====Renal=====  # no active issue  > monitor urine output  > replete lytes as necessary    =====Heme=====  #  normocytic anemia  > consider iron studies    # DVT PPX  > Lovenox 40 mg    =====ID=====  # No active issue    =====Endo=====  # No active issue    =====Skin=====  # No active issue    =====Misc=====  > f/u med rec    =====Social/Ethics=====  - Code status: full code

## 2023-04-15 NOTE — CHART NOTE - NSCHARTNOTEFT_GEN_A_CORE
Patient's mother Aviva called to give additional information on patient's mental health history - patient has h/o unk mood disorder and ADHD (on adderall). Has been increasingly depressed over the past 2 years since one of his friends  by suicide. Patient has been hospitalized in [facility] before for self-injurious behavior. 2d ago, informed his mother that he took 28 pills of ibuprofen and posted goodbye messages on [employer] (concerned friends and co-workers were reaching out to Aviva about this), and reportedly vomited all the pills.   Aviva's number: [phone]    Ani Delgado MD PGY2
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
## 2023-04-15 NOTE — BH CONSULTATION LIAISON ASSESSMENT NOTE - DETAILS
patient denies he wanted to kill self but did OD prior to admission with 40 percocet and 20 benzos (xanax vs clonopin)  has hx of SA

## 2023-04-15 NOTE — BH CONSULTATION LIAISON ASSESSMENT NOTE - VIOLENCE RISK FACTORS:
Feeling of being under threat and being unable to control threat/Antisocial behavior/cognition (past or present)/Irritability
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
## 2023-04-15 NOTE — CHART NOTE - NSCHARTNOTEFT_GEN_A_CORE
This report was requested by: Ani Delgado | Reference #: [id number]    Others' Prescriptions  Patient Name: Sha Pascal Date: 2000  Address: [address] [place] [id number]: Male  Rx Written	Rx Dispensed	Drug	Quantity	Days Supply	Prescriber Name	Prescriber Holli #	Payment Method	Dispenser  04/04/2023	04/09/2023	adderall xr 20 mg capsule	30	30	MagFlaco chavez MD	[id number]	Insurance	[facility] #[id number]  04/04/2023	04/07/2023	testosterone cyp 200 mg/ml	2ml	28	MagFlaco chavez MD	[id number]	Insurance	[facility] #[id number]  03/08/2023	03/11/2023	adderall xr 20 mg capsule	30	30	MagierFlaco MD	[id number]	Insurance	[facility] #[id number]  03/08/2023	03/08/2023	testosterone cyp 200 mg/ml	2ml	28	MagFlaco chavez MD	[id number]	Insurance	[facility] #[id number]  02/09/2023	02/10/2023	adderall xr 20 mg capsule	30	30	MagierFlaco MD	[id number]	Insurance	[facility] #[id number]  02/09/2023	02/10/2023	testosterone cyp 200 mg/ml	2ml	30	MagFlaco chavez MD	[id number]	Insurance	[facility] #[id number]  01/03/2023	01/10/2023	adderall xr 20 mg capsule	30	30	MagierFlaco MD	[id number]	Insurance	[facility] #[id number]  01/03/2023	01/10/2023	testosterone cyp 200 mg/ml	2ml	28	MagFlaco chavez MD	[id number]	Insurance	[facility] #[id number]  12/09/2022	12/12/2022	adderall xr 20 mg capsule	30	30	MagFlaco chavez MD	[id number]	Insurance	[facility] #[id number]  12/09/2022	12/11/2022	testosterone cyp 200 mg/ml	2ml	28	MagFlaco chavez MD	[id number]	Insurance	[facility] #[id number]  11/08/2022	11/19/2022	adderall xr 20 mg capsule	30	30	MagFlaco chavez MD	[id number]	Insurance	[facility] #[id number]  11/08/2022	11/13/2022	testosterone cyp 200 mg/ml	2ml	28	MagFlaco chavez MD	[id number]	Insurance	[facility] #[id number]  10/17/2022	10/21/2022	adderall xr 20 mg capsule	30	30	MagierFlaco MD	[id number]	Insurance	[facility] #[id number]  10/17/2022	10/17/2022	testosterone cyp 200 mg/ml	2ml	28	MagFlaco chavez MD	[id number]	Insurance	[facility] #[id number]  09/15/2022	09/19/2022	adderall xr 20 mg capsule	30	30	MagierFlaco MD	[id number]	Insurance	[facility] #[id number]  08/03/2022	08/07/2022	adderall xr 20 mg capsule	30	30	MagierFlaco MD	[id number]	Insurance	[facility] #[id number]  07/07/2022	07/09/2022	adderall xr 20 mg capsule	30	30	MagierFlaco MD	[id number]	Insurance	[facility] #[id number]  07/07/2022	07/09/2022	testosterone cyp 200 mg/ml	2ml	28	MagierFlaco MD	[id number]	Insurance	[facility] #[id number]  06/09/2022	06/10/2022	adderall xr 20 mg capsule	30	30	MagierFlaco MD	[id number]	Insurance	[facility] #[id number]  06/09/2022	06/10/2022	testosterone cyp 200 mg/ml	2ml	28	MagFlaco chavez MD	[id number]	Insurance	[facility] #[id number]  05/11/2022	05/12/2022	adderall xr 20 mg capsule	30	30	MagFlaco chavez MD	[id number]	Insurance	[facility] #[id number]  05/11/2022	05/12/2022	testosterone cyp 200 mg/ml	4ml	28	MagFlaco chavez MD	[id number]	Insurance	[facility] #[id number]  04/13/2022	04/27/2022	adderall xr 20 mg capsule	30	30	MagFlaco chavez MD	[id number]	Insurance	[facility] #[id number]  04/13/2022	04/17/2022	testosterone cyp 200 mg/ml	2ml	28	Flaco Mayo MD	[id number]	[facility] #[id number]

## 2023-04-15 NOTE — BH CONSULTATION LIAISON ASSESSMENT NOTE - NSBHATTESTAPPAMEND_PSY_A_CORE
I have personally seen and examined this patient. I fully participated in the care of this patient. I have made amendments to the documentation where appropriate and otherwise agree with the history, physical exam, and plan as documented by the TEA

## 2023-04-15 NOTE — BH CONSULTATION LIAISON ASSESSMENT NOTE - NSBHCHARTREVIEWVS_PSY_A_CORE FT
Vital Signs Last 24 Hrs  T(C): 36.8 (15 Apr 2023 08:00), Max: 36.9 (14 Apr 2023 21:32)  T(F): 98.3 (15 Apr 2023 08:00), Max: 98.4 (14 Apr 2023 21:32)  HR: 55 (15 Apr 2023 12:00) (47 - 95)  BP: 97/69 (15 Apr 2023 12:00) (92/63 - 166/106)  BP(mean): 79 (15 Apr 2023 12:00) (73 - 121)  RR: 12 (15 Apr 2023 12:00) (12 - 18)  SpO2: 100% (15 Apr 2023 12:00) (95% - 100%)    Parameters below as of 15 Apr 2023 12:00  Patient On (Oxygen Delivery Method): face tent    O2 Concentration (%): 40
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
## 2023-04-15 NOTE — BH CONSULTATION LIAISON ASSESSMENT NOTE - NSBHATTESTCOMMENTATTENDFT_PSY_A_CORE
Met with the patient via tele platform, along with acp, impression and plan discussed and agreed upon

## 2023-04-15 NOTE — CHART NOTE - NSCHARTNOTEFT_GEN_A_CORE
MICU Transfer Note    Transfer from: MICU  Transfer to:  (x) Medicine    (  ) Telemetry    (  ) RCU    (  ) Palliative    (  ) Stroke Unit    (  ) _______________    Accepting physician:     HPI:     22 y/o M w/ a PMHx of gastric sleeve surgery, ADHD, bipolar d/o, depression who p/w intentional OD of oxycodone/tylenol and xanax at 4/14 8:45 pm for a suicide attempt. He ingested oxycodone/tylenol 325mg/5mg x 40 and xanax (dosage unknown) x20.   intubated for airway protection, admitted to the MICU    MICU COURSE:   Weaned off sedation, extubated to room air, psych consulted, on 1:1    For Follow-Up:  [ ] continue constant observation  [ ] SBIRT  [ ] trend CMP  [ ] monitor for s.s of withdrawal (( does not take benzos regularly ))  [ ] tox recs  [ ] psych recs  [ ] psych safety assessment prior to discharge    ASSESSMENT & PLAN:     =====Neuro/Psych=====    # intentional drug overdose  # suicide attempts  # OD of opiate  # OD of tylenol  # OD of benzo  intubated for airway protection  - salicylate level & alcohol level negative. Initial acetaminophen level at 16.   - urine tox screen positive for  amphetamine, benzo, oxycodone (expected)  - no gastric decontamination due to history of gastric sleeve  - no further treatment required for opiate and benzo overdose beside supportive therapy  > appreciate tox recs  > trend CMP    #ADHD  # bipolar disorder  # depression  # SI  > hold home Adderall  > psych consult once extubated and wean off sedation  > SBIRT consult once extubated and wean off sedation  > 1:1 constant observation once extubated and wean off sedation    =====CV=====  - No active issues.     =====Resp=====  - No active issues.     =====GI=====  # Hx of gastric sleeve  # vomiting  - NBNB vomiting per witness on scene  - likely in setting of acute ingestion  > Protonix 40 mg IV once daily  > Diet regular Kosher    =====Renal=====  # no active issue  > monitor urine output  > replete lytes as necessary    =====Heme=====  #  normocytic anemia  > consider iron studies    # DVT PPX  > Lovenox 40 mg    =====ID=====  # No active issue    =====Endo=====  # No active issue    =====Skin=====  # No active issue    =====Social/Ethics=====  - Code status: full code    Vital Signs Last 24 Hrs  T(C): 36.8 (15 Apr 2023 08:00), Max: 36.9 (14 Apr 2023 21:32)  T(F): 98.3 (15 Apr 2023 08:00), Max: 98.4 (14 Apr 2023 21:32)  HR: 51 (15 Apr 2023 15:00) (47 - 95)  BP: 103/74 (15 Apr 2023 15:00) (92/63 - 166/106)  BP(mean): 84 (15 Apr 2023 15:00) (61 - 121)  RR: 23 (15 Apr 2023 15:00) (10 - 23)  SpO2: 96% (15 Apr 2023 15:00) (95% - 100%)    Parameters below as of 15 Apr 2023 15:00  Patient On (Oxygen Delivery Method): room air      I&O's Summary    14 Apr 2023 07:01  -  15 Apr 2023 07:00  --------------------------------------------------------  IN: 367.3 mL / OUT: 875 mL / NET: -507.7 mL    15 Apr 2023 07:01  -  15 Apr 2023 17:05  --------------------------------------------------------  IN: 0 mL / OUT: 50 mL / NET: -50 mL          MEDICATIONS  (STANDING):  chlorhexidine 2% Cloths 1 Application(s) Topical <User Schedule>  enoxaparin Injectable 40 milliGRAM(s) SubCutaneous every 24 hours  pantoprazole  Injectable 40 milliGRAM(s) IV Push daily  propofol Infusion 50 MICROgram(s)/kG/Min (19.5 mL/Hr) IV Continuous <Continuous>    MEDICATIONS  (PRN):  ondansetron Injectable 4 milliGRAM(s) IV Push every 8 hours PRN Nausea and/or Vomiting        LABS                                            11.1                  Neurophils% (auto):   46.0   (04-15 @ 01:10):    5.20 )-----------(222          Lymphocytes% (auto):  41.7                                          35.9                   Eosinphils% (auto):   2.7      Manual%: Neutrophils x    ; Lymphocytes x    ; Eosinophils x    ; Bands%: x    ; Blasts x                                    144    |  106    |  14                  Calcium: 9.1   / iCa: x      (04-15 @ 01:10)    ----------------------------<  85        Magnesium: 2.20                             3.4     |  25     |  1.12             Phosphorous: 4.6      TPro  6.2    /  Alb  4.0    /  TBili  0.4    /  DBili  x      /  AST  17     /  ALT  9      /  AlkPhos  55     15 Apr 2023 01:10    ( 04-15 @ 01:10 )   PT: 13.9 sec;   INR: 1.20 ratio  aPTT: 31.1 sec MICU Transfer Note    Transfer from: MICU  Transfer to:  (x) Medicine    (  ) Telemetry    (  ) RCU    (  ) Palliative    (  ) Stroke Unit    (  ) _______________    Accepting physician: Dr. Roque    HPI:     22 y/o M w/ a PMHx of gastric sleeve surgery, ADHD, bipolar d/o, depression who p/w intentional OD of oxycodone/tylenol and xanax at 4/14 8:45 pm for a suicide attempt. He ingested oxycodone/tylenol 325mg/5mg x 40 and xanax (dosage unknown) x20.   intubated for airway protection, admitted to the MICU.     MICU COURSE:   Weaned off sedation & extubated to room air 4/15, psych consulted, on 1:1 and in PD custody at this time    For Follow-Up:  [ ] continue constant observation  [ ] SBIRT  [ ] trend CMP  [ ] monitor for s.s of withdrawal (( does not take benzos regularly ))  [ ] tox recs  [ ] psych recs  [ ] psych safety assessment prior to discharge    ASSESSMENT & PLAN:     =====Neuro/Psych=====    # intentional drug overdose  # suicide attempts  # OD of opiate  # OD of tylenol  # OD of benzo  intubated for airway protection  - salicylate level & alcohol level negative. Initial acetaminophen level at 16, repeat 22.  - urine tox screen positive for  amphetamine, benzo, oxycodone (expected)  - no gastric decontamination due to history of gastric sleeve  - no further treatment required for opiate and benzo overdose beside supportive therapy  > appreciate tox recs  > trend CMP    #ADHD  # bipolar disorder  # depression  # SI  > hold home Adderall  > psychiatry following, appreciate recs  > prn Ativan for agitation, has not required  > SBIRT consult   > 1:1 constant observation     =====CV=====  - No active issues.     =====Resp=====  - No active issues.     =====GI=====  # Hx of gastric sleeve  # vomiting  - NBNB vomiting per witness on scene  - likely in setting of acute ingestion  > PRN zofran available, QTc 418 on admission EKG  > Protonix 40 mg IV once daily  > Diet regular Kosher    =====Renal=====  # no active issue  > monitor urine output  > replete lytes as necessary    =====Heme=====  #  normocytic anemia  > consider iron studies    # DVT PPX  > Lovenox 40 mg    =====ID=====  # No active issue    =====Endo=====  # No active issue    =====Skin=====  # No active issue    =====Social/Ethics=====  - Code status: full code    Vital Signs Last 24 Hrs  T(C): 36.8 (15 Apr 2023 08:00), Max: 36.9 (14 Apr 2023 21:32)  T(F): 98.3 (15 Apr 2023 08:00), Max: 98.4 (14 Apr 2023 21:32)  HR: 51 (15 Apr 2023 15:00) (47 - 95)  BP: 103/74 (15 Apr 2023 15:00) (92/63 - 166/106)  BP(mean): 84 (15 Apr 2023 15:00) (61 - 121)  RR: 23 (15 Apr 2023 15:00) (10 - 23)  SpO2: 96% (15 Apr 2023 15:00) (95% - 100%)    Parameters below as of 15 Apr 2023 15:00  Patient On (Oxygen Delivery Method): room air      I&O's Summary    14 Apr 2023 07:01  -  15 Apr 2023 07:00  --------------------------------------------------------  IN: 367.3 mL / OUT: 875 mL / NET: -507.7 mL    15 Apr 2023 07:01  -  15 Apr 2023 17:05  --------------------------------------------------------  IN: 0 mL / OUT: 50 mL / NET: -50 mL          MEDICATIONS  (STANDING):    enoxaparin Injectable 40 milliGRAM(s) SubCutaneous every 24 hours  pantoprazole  Injectable 40 milliGRAM(s) IV Push daily      MEDICATIONS  (PRN):  ondansetron Injectable 4 milliGRAM(s) IV Push every 8 hours PRN Nausea and/or Vomiting        LABS                                            11.1                  Neurophils% (auto):   46.0   (04-15 @ 01:10):    5.20 )-----------(222          Lymphocytes% (auto):  41.7                                          35.9                   Eosinphils% (auto):   2.7      Manual%: Neutrophils x    ; Lymphocytes x    ; Eosinophils x    ; Bands%: x    ; Blasts x                                    144    |  106    |  14                  Calcium: 9.1   / iCa: x      (04-15 @ 01:10)    ----------------------------<  85        Magnesium: 2.20                             3.4     |  25     |  1.12             Phosphorous: 4.6      TPro  6.2    /  Alb  4.0    /  TBili  0.4    /  DBili  x      /  AST  17     /  ALT  9      /  AlkPhos  55     15 Apr 2023 01:10    ( 04-15 @ 01:10 )   PT: 13.9 sec;   INR: 1.20 ratio  aPTT: 31.1 sec

## 2023-04-15 NOTE — PATIENT PROFILE ADULT - HAVE YOU EXPERIENCED VIOLENCE OR A TRAUMATIC EVENT?
Nutrition Care Plan    Nutrition Diagnosis:   Increased nutrient needs  related to covid-19 infection, and multiple abrasions/wounds as evidenced by estimated nutritional needs.    Intervention:  General/healthful diet:   Regular Diet   · Encourage intakes, promote intakes of high calorie high protein foods     Commerical food:     · Provide one Magic Cup pudding TID with meals     Monitoring and Evaluation:  Amount of food:   Goal for patient to consume 75% or greater of meals and supplements   · PO intakes: patient ate ice cream and a banana for breakfast today    yes

## 2023-04-15 NOTE — PROGRESS NOTE ADULT - SUBJECTIVE AND OBJECTIVE BOX
Progress Note     == template placeholder draft - patient to be seen - plan not finalized till attested by attending ==     Yolanda Carrasco  PGY-1   Teams    Patient is a 23y old  Male who presents with a chief complaint of Intentional overdose (14 Apr 2023 23:53)          SUBJECTIVE / INTERVAL EVENTS  - No acute events overnight.  - Patient denied new complaints  - Patient endorsed          MEDICATIONS    MEDICATIONS  (STANDING):  chlorhexidine 0.12% Liquid 15 milliLiter(s) Oral Mucosa every 12 hours  chlorhexidine 2% Cloths 1 Application(s) Topical <User Schedule>  enoxaparin Injectable 40 milliGRAM(s) SubCutaneous every 24 hours  pantoprazole  Injectable 40 milliGRAM(s) IV Push daily  propofol Infusion 50 MICROgram(s)/kG/Min (19.5 mL/Hr) IV Continuous <Continuous>    MEDICATIONS  (PRN):         VITALS / I&O's  T(C): 36.7 (04-15-23 @ 04:00), Max: 36.9 (04-14-23 @ 21:32)  HR: 49 (04-15-23 @ 07:11) (48 - 95)  BP: 111/87 (04-15-23 @ 06:00) (105/78 - 166/106)  RR: 14 (04-15-23 @ 06:00) (14 - 18)  SpO2: 100% (04-15-23 @ 07:11) (98% - 100%)  I&O's Summary    14 Apr 2023 07:01  -  15 Apr 2023 07:00  --------------------------------------------------------  IN: 367.3 mL / OUT: 875 mL / NET: -507.7 mL           PHYSICAL EXAM           LABS                        11.1   5.20  )-----------( 222      ( 15 Apr 2023 01:10 )             35.9     04-15    144  |  106  |  14  ----------------------------<  85  3.4<L>   |  25  |  1.12    Ca    9.1      15 Apr 2023 01:10  Phos  4.6     04-15  Mg     2.20     04-15    TPro  6.2  /  Alb  4.0  /  TBili  0.4  /  DBili  x   /  AST  17  /  ALT  9   /  AlkPhos  55  04-15    CAPILLARY BLOOD GLUCOSE      POCT Blood Glucose.: 147 mg/dL (14 Apr 2023 21:44)    PT/INR - ( 15 Apr 2023 01:10 )   PT: 13.9 sec;   INR: 1.20 ratio         PTT - ( 15 Apr 2023 01:10 )  PTT:31.1 sec  LIVER FUNCTIONS - ( 15 Apr 2023 01:10 )  Alb: 4.0 g/dL / Pro: 6.2 g/dL / ALK PHOS: 55 U/L / ALT: 9 U/L / AST: 17 U/L / GGT: x           ABG - ( 14 Apr 2023 23:25 )  pH, Arterial: 7.42  pH, Blood: x     /  pCO2: 45    /  pO2: 219   / HCO3: 29    / Base Excess: 4.1   /  SaO2: 99.9                          Progress Note     Yolanda Carrasco  PGY-1   Teams    Patient is a 23y old  Male who presents with a chief complaint of Intentional overdose (14 Apr 2023 23:53)          SUBJECTIVE / INTERVAL EVENTS  - No acute events since admission overnight.  - Patient intubated and sedated.       MEDICATIONS    MEDICATIONS  (STANDING):  chlorhexidine 0.12% Liquid 15 milliLiter(s) Oral Mucosa every 12 hours  chlorhexidine 2% Cloths 1 Application(s) Topical <User Schedule>  enoxaparin Injectable 40 milliGRAM(s) SubCutaneous every 24 hours  pantoprazole  Injectable 40 milliGRAM(s) IV Push daily  propofol Infusion 50 MICROgram(s)/kG/Min (19.5 mL/Hr) IV Continuous <Continuous>    MEDICATIONS  (PRN):         VITALS / I&O's  T(C): 36.7 (04-15-23 @ 04:00), Max: 36.9 (04-14-23 @ 21:32)  HR: 49 (04-15-23 @ 07:11) (48 - 95)  BP: 111/87 (04-15-23 @ 06:00) (105/78 - 166/106)  RR: 14 (04-15-23 @ 06:00) (14 - 18)  SpO2: 100% (04-15-23 @ 07:11) (98% - 100%)  I&O's Summary    14 Apr 2023 07:01  -  15 Apr 2023 07:00  --------------------------------------------------------  IN: 367.3 mL / OUT: 875 mL / NET: -507.7 mL           PHYSICAL EXAM  General: Intubated, sedated  HEENT: Normocephalic, atraumatic.   Eyes: Extraocular movements grossly intact. Conjunctiva and sclera clear.  Nose/Throat: No rhinorrhea. No epistaxis.  Neuro: Intubated, sedated  CV: Regular rate and rhythm. S1/S2. No murmurs appreciated. No distal edema.  Respiratory: Anterior lung fields clear bilaterally. No crackles or wheezes appreciated.  Abdomen: Soft; nontender to palpation, all quadrants; underdistended. No guarding.  : Suprapubic region nontender. Clear yellow urine in [employer].  Skin: No rashes or bruising of face, forearms, or calves bilaterally. Extensive tattoos.  Psych: Intubated, sedated         LABS                        11.1   5.20  )-----------( 222      ( 15 Apr 2023 01:10 )             35.9     04-15    144  |  106  |  14  ----------------------------<  85  3.4<L>   |  25  |  1.12    Ca    9.1      15 Apr 2023 01:10  Phos  4.6     04-15  Mg     2.20     04-15    TPro  6.2  /  Alb  4.0  /  TBili  0.4  /  DBili  x   /  AST  17  /  ALT  9   /  AlkPhos  55  04-15    CAPILLARY BLOOD GLUCOSE      POCT Blood Glucose.: 147 mg/dL (14 Apr 2023 21:44)    PT/INR - ( 15 Apr 2023 01:10 )   PT: 13.9 sec;   INR: 1.20 ratio         PTT - ( 15 Apr 2023 01:10 )  PTT:31.1 sec  LIVER FUNCTIONS - ( 15 Apr 2023 01:10 )  Alb: 4.0 g/dL / Pro: 6.2 g/dL / ALK PHOS: 55 U/L / ALT: 9 U/L / AST: 17 U/L / GGT: x           ABG - ( 14 Apr 2023 23:25 )  pH, Arterial: 7.42  pH, Blood: x     /  pCO2: 45    /  pO2: 219   / HCO3: 29    / Base Excess: 4.1   /  SaO2: 99.9                          Progress Note     Yolanda Carrasco  PGY-1   Teams    Patient is a 23y old  Male who presents with a chief complaint of Intentional overdose (14 Apr 2023 23:53)          SUBJECTIVE / INTERVAL EVENTS  - No acute events since admission overnight.  - Patient intubated and sedated.       MEDICATIONS    MEDICATIONS  (STANDING):  chlorhexidine 0.12% Liquid 15 milliLiter(s) Oral Mucosa every 12 hours  chlorhexidine 2% Cloths 1 Application(s) Topical <User Schedule>  enoxaparin Injectable 40 milliGRAM(s) SubCutaneous every 24 hours  pantoprazole  Injectable 40 milliGRAM(s) IV Push daily  propofol Infusion 50 MICROgram(s)/kG/Min (19.5 mL/Hr) IV Continuous <Continuous>    MEDICATIONS  (PRN):         VITALS / I&O's  T(C): 36.7 (04-15-23 @ 04:00), Max: 36.9 (04-14-23 @ 21:32)  HR: 49 (04-15-23 @ 07:11) (48 - 95)  BP: 111/87 (04-15-23 @ 06:00) (105/78 - 166/106)  RR: 14 (04-15-23 @ 06:00) (14 - 18)  SpO2: 100% (04-15-23 @ 07:11) (98% - 100%)  I&O's Summary    14 Apr 2023 07:01  -  15 Apr 2023 07:00  --------------------------------------------------------  IN: 367.3 mL / OUT: 875 mL / NET: -507.7 mL           PHYSICAL EXAM  General: Intubated, sedated  HEENT: Normocephalic, atraumatic.   Eyes: Extraocular movements grossly intact. Conjunctiva and sclera clear.  Nose/Throat: No rhinorrhea. No epistaxis.  Neuro: Intubated, sedated  CV: Slow rate, regular rhythm. S1/S2. No murmurs appreciated. No distal edema.  Respiratory: Anterior lung fields clear bilaterally. No crackles or wheezes appreciated.  Abdomen: Soft; nontender to palpation, all quadrants; underdistended. No guarding.  : Suprapubic region nontender. Clear yellow urine in [employer].  Skin: No rashes or bruising of face, forearms, or calves bilaterally. Extensive tattoos.  Psych: Intubated, sedated         LABS                        11.1   5.20  )-----------( 222      ( 15 Apr 2023 01:10 )             35.9     04-15    144  |  106  |  14  ----------------------------<  85  3.4<L>   |  25  |  1.12    Ca    9.1      15 Apr 2023 01:10  Phos  4.6     04-15  Mg     2.20     04-15    TPro  6.2  /  Alb  4.0  /  TBili  0.4  /  DBili  x   /  AST  17  /  ALT  9   /  AlkPhos  55  04-15    CAPILLARY BLOOD GLUCOSE      POCT Blood Glucose.: 147 mg/dL (14 Apr 2023 21:44)    PT/INR - ( 15 Apr 2023 01:10 )   PT: 13.9 sec;   INR: 1.20 ratio         PTT - ( 15 Apr 2023 01:10 )  PTT:31.1 sec  LIVER FUNCTIONS - ( 15 Apr 2023 01:10 )  Alb: 4.0 g/dL / Pro: 6.2 g/dL / ALK PHOS: 55 U/L / ALT: 9 U/L / AST: 17 U/L / GGT: x           ABG - ( 14 Apr 2023 23:25 )  pH, Arterial: 7.42  pH, Blood: x     /  pCO2: 45    /  pO2: 219   / HCO3: 29    / Base Excess: 4.1   /  SaO2: 99.9

## 2023-04-16 LAB
ALBUMIN SERPL ELPH-MCNC: 3.8 G/DL — SIGNIFICANT CHANGE UP (ref 3.3–5)
ALP SERPL-CCNC: 61 U/L — SIGNIFICANT CHANGE UP (ref 40–120)
ALT FLD-CCNC: 11 U/L — SIGNIFICANT CHANGE UP (ref 4–41)
ANION GAP SERPL CALC-SCNC: 11 MMOL/L — SIGNIFICANT CHANGE UP (ref 7–14)
APTT BLD: 32.1 SEC — SIGNIFICANT CHANGE UP (ref 27–36.3)
AST SERPL-CCNC: 20 U/L — SIGNIFICANT CHANGE UP (ref 4–40)
BASOPHILS # BLD AUTO: 0.01 K/UL — SIGNIFICANT CHANGE UP (ref 0–0.2)
BASOPHILS NFR BLD AUTO: 0.2 % — SIGNIFICANT CHANGE UP (ref 0–2)
BILIRUB SERPL-MCNC: 0.3 MG/DL — SIGNIFICANT CHANGE UP (ref 0.2–1.2)
BUN SERPL-MCNC: 13 MG/DL — SIGNIFICANT CHANGE UP (ref 7–23)
CALCIUM SERPL-MCNC: 8.9 MG/DL — SIGNIFICANT CHANGE UP (ref 8.4–10.5)
CHLORIDE SERPL-SCNC: 104 MMOL/L — SIGNIFICANT CHANGE UP (ref 98–107)
CO2 SERPL-SCNC: 24 MMOL/L — SIGNIFICANT CHANGE UP (ref 22–31)
CREAT SERPL-MCNC: 0.9 MG/DL — SIGNIFICANT CHANGE UP (ref 0.5–1.3)
EGFR: 122 ML/MIN/1.73M2 — SIGNIFICANT CHANGE UP
EOSINOPHIL # BLD AUTO: 0.15 K/UL — SIGNIFICANT CHANGE UP (ref 0–0.5)
EOSINOPHIL NFR BLD AUTO: 2.6 % — SIGNIFICANT CHANGE UP (ref 0–6)
GLUCOSE SERPL-MCNC: 83 MG/DL — SIGNIFICANT CHANGE UP (ref 70–99)
HCT VFR BLD CALC: 36.1 % — LOW (ref 39–50)
HGB BLD-MCNC: 11.1 G/DL — LOW (ref 13–17)
IANC: 3.85 K/UL — SIGNIFICANT CHANGE UP (ref 1.8–7.4)
IMM GRANULOCYTES NFR BLD AUTO: 0.3 % — SIGNIFICANT CHANGE UP (ref 0–0.9)
INR BLD: 1.14 RATIO — SIGNIFICANT CHANGE UP (ref 0.88–1.16)
LYMPHOCYTES # BLD AUTO: 1.34 K/UL — SIGNIFICANT CHANGE UP (ref 1–3.3)
LYMPHOCYTES # BLD AUTO: 23.4 % — SIGNIFICANT CHANGE UP (ref 13–44)
MAGNESIUM SERPL-MCNC: 1.9 MG/DL — SIGNIFICANT CHANGE UP (ref 1.6–2.6)
MCHC RBC-ENTMCNC: 26.1 PG — LOW (ref 27–34)
MCHC RBC-ENTMCNC: 30.7 GM/DL — LOW (ref 32–36)
MCV RBC AUTO: 84.7 FL — SIGNIFICANT CHANGE UP (ref 80–100)
MONOCYTES # BLD AUTO: 0.35 K/UL — SIGNIFICANT CHANGE UP (ref 0–0.9)
MONOCYTES NFR BLD AUTO: 6.1 % — SIGNIFICANT CHANGE UP (ref 2–14)
NEUTROPHILS # BLD AUTO: 3.85 K/UL — SIGNIFICANT CHANGE UP (ref 1.8–7.4)
NEUTROPHILS NFR BLD AUTO: 67.4 % — SIGNIFICANT CHANGE UP (ref 43–77)
NRBC # BLD: 0 /100 WBCS — SIGNIFICANT CHANGE UP (ref 0–0)
NRBC # FLD: 0 K/UL — SIGNIFICANT CHANGE UP (ref 0–0)
PHOSPHATE SERPL-MCNC: 3.5 MG/DL — SIGNIFICANT CHANGE UP (ref 2.5–4.5)
PLATELET # BLD AUTO: 212 K/UL — SIGNIFICANT CHANGE UP (ref 150–400)
POTASSIUM SERPL-MCNC: 3.7 MMOL/L — SIGNIFICANT CHANGE UP (ref 3.5–5.3)
POTASSIUM SERPL-SCNC: 3.7 MMOL/L — SIGNIFICANT CHANGE UP (ref 3.5–5.3)
PROT SERPL-MCNC: 6.1 G/DL — SIGNIFICANT CHANGE UP (ref 6–8.3)
PROTHROM AB SERPL-ACNC: 13.3 SEC — SIGNIFICANT CHANGE UP (ref 10.5–13.4)
RBC # BLD: 4.26 M/UL — SIGNIFICANT CHANGE UP (ref 4.2–5.8)
RBC # FLD: 15.7 % — HIGH (ref 10.3–14.5)
SODIUM SERPL-SCNC: 139 MMOL/L — SIGNIFICANT CHANGE UP (ref 135–145)
WBC # BLD: 5.72 K/UL — SIGNIFICANT CHANGE UP (ref 3.8–10.5)
WBC # FLD AUTO: 5.72 K/UL — SIGNIFICANT CHANGE UP (ref 3.8–10.5)

## 2023-04-16 PROCEDURE — 99291 CRITICAL CARE FIRST HOUR: CPT | Mod: GC

## 2023-04-16 RX ORDER — KETOROLAC TROMETHAMINE 30 MG/ML
15 SYRINGE (ML) INJECTION ONCE
Refills: 0 | Status: DISCONTINUED | OUTPATIENT
Start: 2023-04-16 | End: 2023-04-16

## 2023-04-16 RX ORDER — POTASSIUM CHLORIDE 20 MEQ
40 PACKET (EA) ORAL ONCE
Refills: 0 | Status: COMPLETED | OUTPATIENT
Start: 2023-04-16 | End: 2023-04-16

## 2023-04-16 RX ORDER — LANOLIN ALCOHOL/MO/W.PET/CERES
6 CREAM (GRAM) TOPICAL AT BEDTIME
Refills: 0 | Status: DISCONTINUED | OUTPATIENT
Start: 2023-04-16 | End: 2023-04-19

## 2023-04-16 RX ORDER — MAGNESIUM SULFATE 500 MG/ML
2 VIAL (ML) INJECTION ONCE
Refills: 0 | Status: COMPLETED | OUTPATIENT
Start: 2023-04-16 | End: 2023-04-16

## 2023-04-16 RX ORDER — ACETAMINOPHEN 500 MG
650 TABLET ORAL ONCE
Refills: 0 | Status: COMPLETED | OUTPATIENT
Start: 2023-04-16 | End: 2023-04-16

## 2023-04-16 RX ADMIN — Medication 15 MILLIGRAM(S): at 11:30

## 2023-04-16 RX ADMIN — ENOXAPARIN SODIUM 40 MILLIGRAM(S): 100 INJECTION SUBCUTANEOUS at 11:06

## 2023-04-16 RX ADMIN — Medication 15 MILLIGRAM(S): at 11:01

## 2023-04-16 RX ADMIN — PANTOPRAZOLE SODIUM 40 MILLIGRAM(S): 20 TABLET, DELAYED RELEASE ORAL at 11:00

## 2023-04-16 RX ADMIN — Medication 25 GRAM(S): at 05:35

## 2023-04-16 RX ADMIN — ONDANSETRON 4 MILLIGRAM(S): 8 TABLET, FILM COATED ORAL at 11:01

## 2023-04-16 RX ADMIN — Medication 650 MILLIGRAM(S): at 23:15

## 2023-04-16 RX ADMIN — CHLORHEXIDINE GLUCONATE 1 APPLICATION(S): 213 SOLUTION TOPICAL at 05:35

## 2023-04-16 RX ADMIN — Medication 40 MILLIEQUIVALENT(S): at 05:35

## 2023-04-16 NOTE — PROGRESS NOTE ADULT - SUBJECTIVE AND OBJECTIVE BOX
PROGRESS NOTE:     Patient is a 23y old  Male who presents with a chief complaint of Intentional overdose (15 Apr 2023 07:36)    SUBJECTIVE / OVERNIGHT EVENTS:  No acute events overnight. Patient seen and evaluated at bedside. No fever/chills.  Denies SOB at rest, chest pain, palpitations, abdominal pain, nausea/vomiting    ADDITIONAL REVIEW OF SYSTEMS:    MEDICATIONS  (STANDING):  chlorhexidine 2% Cloths 1 Application(s) Topical <User Schedule>  enoxaparin Injectable 40 milliGRAM(s) SubCutaneous every 24 hours  pantoprazole  Injectable 40 milliGRAM(s) IV Push daily    MEDICATIONS  (PRN):  LORazepam   Injectable 1 milliGRAM(s) IV Push every 6 hours PRN Agitation  ondansetron Injectable 4 milliGRAM(s) IV Push every 8 hours PRN Nausea and/or Vomiting      CAPILLARY BLOOD GLUCOSE        I&O's Summary    15 Apr 2023 07:01  -  16 Apr 2023 07:00  --------------------------------------------------------  IN: 92 mL / OUT: 110 mL / NET: -18 mL        PHYSICAL EXAM:  Vital Signs Last 24 Hrs  T(C): 37.6 (16 Apr 2023 06:00), Max: 37.6 (16 Apr 2023 06:00)  T(F): 99.6 (16 Apr 2023 06:00), Max: 99.6 (16 Apr 2023 06:00)  HR: 80 (16 Apr 2023 06:00) (47 - 80)  BP: 105/61 (16 Apr 2023 06:00) (92/63 - 114/68)  BP(mean): 75 (16 Apr 2023 06:00) (61 - 97)  RR: 18 (16 Apr 2023 06:00) (10 - 23)  SpO2: 96% (16 Apr 2023 06:00) (95% - 100%)    Parameters below as of 16 Apr 2023 06:00  Patient On (Oxygen Delivery Method): room air        CONSTITUTIONAL: NAD, well-developed  RESPIRATORY: Normal respiratory effort; lungs are clear to auscultation bilaterally  CARDIOVASCULAR: Regular rate and rhythm, normal S1 and S2, no murmur/rub/gallop; No lower extremity edema; Peripheral pulses are 2+ bilaterally  ABDOMEN: Nontender to palpation, normoactive bowel sounds, no rebound/guarding; No hepatosplenomegaly  MUSCLOSKELETAL: no clubbing or cyanosis of digits; no joint swelling or tenderness to palpation  PSYCH: A+O to person, place, and time; affect appropriate    LABS:                        11.1   5.72  )-----------( 212      ( 16 Apr 2023 00:40 )             36.1     04-16    139  |  104  |  13  ----------------------------<  83  3.7   |  24  |  0.90    Ca    8.9      16 Apr 2023 00:40  Phos  3.5     04-16  Mg     1.90     04-16    TPro  6.1  /  Alb  3.8  /  TBili  0.3  /  DBili  x   /  AST  20  /  ALT  11  /  AlkPhos  61  04-16    PT/INR - ( 16 Apr 2023 00:40 )   PT: 13.3 sec;   INR: 1.14 ratio         PTT - ( 16 Apr 2023 00:40 )  PTT:32.1 sec            RADIOLOGY & ADDITIONAL TESTS:  Results Reviewed:   Imaging Personally Reviewed:  Electrocardiogram Personally Reviewed:    COORDINATION OF CARE:  Care Discussed with Consultants/Other Providers [Y/N]:  Prior or Outpatient Records Reviewed [Y/N]:   PROGRESS NOTE:     Patient is a 23y old  Male who presents with a chief complaint of Intentional overdose (15 Apr 2023 07:36)    SUBJECTIVE / OVERNIGHT EVENTS:  No acute events overnight. Had one episode of emesis this morning, has Zofran available. C/o discomfort to wrists/ankles from handcuffs. PD at bedside. No fever/chills.  Denies SOB at rest, chest pain, palpitations, abdominal pain, nausea/vomiting    ADDITIONAL REVIEW OF SYSTEMS:    MEDICATIONS  (STANDING):  chlorhexidine 2% Cloths 1 Application(s) Topical <User Schedule>  enoxaparin Injectable 40 milliGRAM(s) SubCutaneous every 24 hours  pantoprazole  Injectable 40 milliGRAM(s) IV Push daily    MEDICATIONS  (PRN):  LORazepam   Injectable 1 milliGRAM(s) IV Push every 6 hours PRN Agitation  ondansetron Injectable 4 milliGRAM(s) IV Push every 8 hours PRN Nausea and/or Vomiting      CAPILLARY BLOOD GLUCOSE    I&O's Summary    15 Apr 2023 07:01  -  16 Apr 2023 07:00  --------------------------------------------------------  IN: 92 mL / OUT: 110 mL / NET: -18 mL    PHYSICAL EXAM:  Vital Signs Last 24 Hrs  T(C): 37.6 (16 Apr 2023 06:00), Max: 37.6 (16 Apr 2023 06:00)  T(F): 99.6 (16 Apr 2023 06:00), Max: 99.6 (16 Apr 2023 06:00)  HR: 80 (16 Apr 2023 06:00) (47 - 80)  BP: 105/61 (16 Apr 2023 06:00) (92/63 - 114/68)  BP(mean): 75 (16 Apr 2023 06:00) (61 - 97)  RR: 18 (16 Apr 2023 06:00) (10 - 23)  SpO2: 96% (16 Apr 2023 06:00) (95% - 100%)    Parameters below as of 16 Apr 2023 06:00  Patient On (Oxygen Delivery Method): room air        CONSTITUTIONAL: NAD, well-developed, in handcuffs  RESPIRATORY: Normal respiratory effort; lungs are clear to auscultation bilaterally  CARDIOVASCULAR: Regular rate and rhythm, normal S1 and S2, no murmur/rub/gallop; No lower extremity edema; Peripheral pulses are 2+ bilaterally  ABDOMEN: Nontender to palpation, normoactive bowel sounds, no rebound/guarding; No hepatosplenomegaly  MUSCULOSKELETAL no clubbing or cyanosis of digits; no joint swelling or tenderness to palpation  PSYCH: A+O to person, place, and time; affect appropriate    LABS:                        11.1   5.72  )-----------( 212      ( 16 Apr 2023 00:40 )             36.1     04-16    139  |  104  |  13  ----------------------------<  83  3.7   |  24  |  0.90    Ca    8.9      16 Apr 2023 00:40  Phos  3.5     04-16  Mg     1.90     04-16    TPro  6.1  /  Alb  3.8  /  TBili  0.3  /  DBili  x   /  AST  20  /  ALT  11  /  AlkPhos  61  04-16    PT/INR - ( 16 Apr 2023 00:40 )   PT: 13.3 sec;   INR: 1.14 ratio         PTT - ( 16 Apr 2023 00:40 )  PTT:32.1 sec      COORDINATION OF CARE:  Care Discussed with Consultants/Other Providers [Y/N]: Psychiatry following

## 2023-04-16 NOTE — CHART NOTE - NSCHARTNOTEFT_GEN_A_CORE
MAR Accept Note  Transfer to:  Medicine  Accepting Attending Physician:    Assigned Room:  8N     Patient seen and examined.   Labs and data reviewed.   No findings precluding transfer of service.       HPI/MICU COURSE:   Please refer to MICU transfer note for full details. Briefly, this is a 22 y/o M w/ a PMHx of gastric sleeve surgery, ADHD, bipolar d/o, depression who p/w intentional OD of oxycodone/tylenol and xanax at 4/14 8:45 pm for a suicide attempt. He ingested oxycodone/tylenol 325mg/5mg x 40 and xanax (dosage unknown) x20.   intubated for airway protection, admitted to the MICU. Extubated on 4/15. Psych consulted, and patient on 1:1. Patient also in police custody.       FOR FOLLOW-UP:  For Follow-Up:  [ ] continue constant observation  [ ] SBIRT  [ ] trend CMP  [ ] monitor for s.s of withdrawal (( does not take benzos regularly ))  [ ] tox recs  [ ] psych recs  [ ] psych safety assessment prior to discharge

## 2023-04-16 NOTE — PROVIDER CONTACT NOTE (MEDICATION) - BACKGROUND
Patient admitted with oxycodone & Tylenol overdose. Downgraded from MICU to 8N. It has been over 24 hrs since overdose.

## 2023-04-16 NOTE — PROGRESS NOTE ADULT - ATTENDING COMMENTS
This is a 22 y/o M with PMHx of obesity s/p gastric sleeve and bipolar disorder who presents with toxic ingestion (tylenol, opiates, benzodiazepines) in relation to a questionable suicide attempt. Intubated in ED for airway protection and admitted to MICU for further care.    1) Acute hypoxemic respiratory failure 2/2 aspiration - Requiring only minimal oxygenation. Wean sedation and prepare for extubation today.  2) Toxic ingestion - Suboptimally responsive to narcan. EKG NSR without QT prolongation. Tylenol level 16 -> 22, no need for further monitoring. Continue to monitor respiratory status. Requires 1:1 monitoring for possible suicide attempt. Psych consult when more awake.  3) H/o bipolar disorder - Hold home psych meds for now, resume once toxic metabolites clear.  4) DVT ppx - lovenox  5) GOC - Full code
This is a 22 y/o M with PMHx of obesity s/p gastric sleeve and bipolar disorder who presents with toxic ingestion (tylenol, opiates, benzodiazepines) in relation to a questionable suicide attempt. Intubated in ED for airway protection and admitted to MICU for further care.    1) Acute hypoxemic respiratory failure 2/2 aspiration - Extubated on 4/15, now on room air. Continue to monitor respiratory status.   2) H/o toxic ingestion and ?suicide attempt - Requires 1:1 monitoring for possible suicide attempt. Psych recs appreciated.  3) H/o bipolar disorder - F/u psych recs.  4) DVT ppx - lovenox  5) GOC - Full code    Ready for transfer to medical floor

## 2023-04-16 NOTE — PROGRESS NOTE ADULT - ASSESSMENT
24 y/o M w/ a PMHx of gastric sleeve surgery, ADHD, bipolar d/o, depression who p/w intentional OD of oxycodone/tylenol and xanax at 4/14 8:45 pm for a suicide attempt. He ingested oxycodone/tylenol 325mg/5mg x 40 and xanax (dosage unknown) x20.   intubated for airway protection, admitted to the MICU    =====Neuro=====  # sedation  - prop    # intentional drug overdose  # suicide attempts  # OD of opiate  # OD of tylenol  # OD of benzo  intubated for airway protection  - salicylate level & alcohol level negative. Initial acetaminophen level at 16.   - urine tox screen positive for  amphetamine, benzo, oxycodone (expected)  - no gastric decontamination due to history of gastric sleeve  - no further treatment required for opiate and benzo overdose beside supportive therapy  > appreciate tox recs  > trend CMP    #ADHD  # bipolar disorder  # depression  # SI  > hold home Adderall  > psych consult once extubated and wean off sedation  > SBIRT consult once extubated and wean off sedation  > 1:1 constant observation once extubated and wean off sedation    =====CV=====  # No active issue  > EKG    =====Resp=====  # vent setting  - minimal vent setting, for airway protection.  No intrinsic lung issue    =====GI=====  # Hx of gastric sleeve  # vomiting  - NBNB vomiting per witness on scene  - likely in setting of acute ingestion  > Protonix 40 mg IV once daily  > NPO    =====Renal=====  # no active issue  > monitor urine output  > replete lytes as necessary    =====Heme=====  #  normocytic anemia  > consider iron studies    # DVT PPX  > Lovenox 40 mg    =====ID=====  # No active issue    =====Endo=====  # No active issue    =====Skin=====  # No active issue    =====Misc=====  > f/u med rec    =====Social/Ethics=====  - Code status: full code 22 y/o M w/ a PMHx of gastric sleeve surgery, ADHD, bipolar d/o, depression who p/w intentional OD of oxycodone/tylenol and xanax at 4/14 8:45 pm for a suicide attempt. He ingested oxycodone/tylenol 325mg/5mg x 40 and xanax (dosage unknown) x20. Intubated for airway protection, admitted to the MICU. Extubated 4/15 and doing well. Currently listed for floors. Psych following. Currently in PD custody.    =====Neuro=====  Off of all sedation, no acute concerns    # intentional drug overdose  # suicide attempts  # OD of opiate  # OD of tylenol  # OD of benzo  intubated for airway protection on arrival, extubated 4/15  - salicylate level & alcohol level negative. Initial acetaminophen level at 16, repeat 22  - urine tox screen positive for  amphetamine, benzo, oxycodone (expected)  - no gastric decontamination due to history of gastric sleeve  - no further treatment required for opiate and benzo overdose beside supportive therapy  > appreciate tox recs  > trend CMP    #ADHD  # bipolar disorder  # depression  # SI  > hold home Adderall  > prn Ativan available for agitation, has not required  > psych is following, initial evaluation 4/15 after extubation  > SBIRT consult   > 1:1 constant observation, PD at bedside as well    =====CV=====  # No active issue  > admission EKG NSR, QTc 418    =====Resp=====  off of ventilator, no active issue    =====GI=====  # Hx of gastric sleeve  # vomiting  > PRN Zofran available  > Protonix 40 mg IV once daily    =====Renal=====  # no active issue  > monitor urine output  > replete lytes as necessary    =====Heme=====  #  normocytic anemia  > consider iron studies    # DVT PPX  > Lovenox 40 mg    =====ID=====  # No active issue    =====Endo=====  # No active issue    =====Skin=====  # No active issue    =====Misc=====  > f/u med rec    =====Social/Ethics=====  - Code status: full code

## 2023-04-17 LAB
ALBUMIN SERPL ELPH-MCNC: 4 G/DL — SIGNIFICANT CHANGE UP (ref 3.3–5)
ALP SERPL-CCNC: 59 U/L — SIGNIFICANT CHANGE UP (ref 40–120)
ALT FLD-CCNC: 10 U/L — SIGNIFICANT CHANGE UP (ref 4–41)
ANION GAP SERPL CALC-SCNC: 9 MMOL/L — SIGNIFICANT CHANGE UP (ref 7–14)
AST SERPL-CCNC: 14 U/L — SIGNIFICANT CHANGE UP (ref 4–40)
BASOPHILS # BLD AUTO: 0.01 K/UL — SIGNIFICANT CHANGE UP (ref 0–0.2)
BASOPHILS NFR BLD AUTO: 0.3 % — SIGNIFICANT CHANGE UP (ref 0–2)
BILIRUB SERPL-MCNC: <0.2 MG/DL — SIGNIFICANT CHANGE UP (ref 0.2–1.2)
BUN SERPL-MCNC: 12 MG/DL — SIGNIFICANT CHANGE UP (ref 7–23)
CALCIUM SERPL-MCNC: 9.2 MG/DL — SIGNIFICANT CHANGE UP (ref 8.4–10.5)
CHLORIDE SERPL-SCNC: 106 MMOL/L — SIGNIFICANT CHANGE UP (ref 98–107)
CO2 SERPL-SCNC: 23 MMOL/L — SIGNIFICANT CHANGE UP (ref 22–31)
CREAT SERPL-MCNC: 0.83 MG/DL — SIGNIFICANT CHANGE UP (ref 0.5–1.3)
EGFR: 125 ML/MIN/1.73M2 — SIGNIFICANT CHANGE UP
EOSINOPHIL # BLD AUTO: 0.18 K/UL — SIGNIFICANT CHANGE UP (ref 0–0.5)
EOSINOPHIL NFR BLD AUTO: 4.6 % — SIGNIFICANT CHANGE UP (ref 0–6)
GLUCOSE SERPL-MCNC: 134 MG/DL — HIGH (ref 70–99)
HCT VFR BLD CALC: 37.1 % — LOW (ref 39–50)
HGB BLD-MCNC: 11.7 G/DL — LOW (ref 13–17)
IANC: 1.85 K/UL — SIGNIFICANT CHANGE UP (ref 1.8–7.4)
IMM GRANULOCYTES NFR BLD AUTO: 0.5 % — SIGNIFICANT CHANGE UP (ref 0–0.9)
LYMPHOCYTES # BLD AUTO: 1.56 K/UL — SIGNIFICANT CHANGE UP (ref 1–3.3)
LYMPHOCYTES # BLD AUTO: 39.7 % — SIGNIFICANT CHANGE UP (ref 13–44)
MAGNESIUM SERPL-MCNC: 2.1 MG/DL — SIGNIFICANT CHANGE UP (ref 1.6–2.6)
MCHC RBC-ENTMCNC: 26.7 PG — LOW (ref 27–34)
MCHC RBC-ENTMCNC: 31.5 GM/DL — LOW (ref 32–36)
MCV RBC AUTO: 84.7 FL — SIGNIFICANT CHANGE UP (ref 80–100)
MONOCYTES # BLD AUTO: 0.31 K/UL — SIGNIFICANT CHANGE UP (ref 0–0.9)
MONOCYTES NFR BLD AUTO: 7.9 % — SIGNIFICANT CHANGE UP (ref 2–14)
MRSA PCR RESULT.: DETECTED
NEUTROPHILS # BLD AUTO: 1.85 K/UL — SIGNIFICANT CHANGE UP (ref 1.8–7.4)
NEUTROPHILS NFR BLD AUTO: 47 % — SIGNIFICANT CHANGE UP (ref 43–77)
NRBC # BLD: 0 /100 WBCS — SIGNIFICANT CHANGE UP (ref 0–0)
NRBC # FLD: 0 K/UL — SIGNIFICANT CHANGE UP (ref 0–0)
PHOSPHATE SERPL-MCNC: 3.2 MG/DL — SIGNIFICANT CHANGE UP (ref 2.5–4.5)
PLATELET # BLD AUTO: 199 K/UL — SIGNIFICANT CHANGE UP (ref 150–400)
POTASSIUM SERPL-MCNC: 4.1 MMOL/L — SIGNIFICANT CHANGE UP (ref 3.5–5.3)
POTASSIUM SERPL-SCNC: 4.1 MMOL/L — SIGNIFICANT CHANGE UP (ref 3.5–5.3)
PROT SERPL-MCNC: 6.2 G/DL — SIGNIFICANT CHANGE UP (ref 6–8.3)
RBC # BLD: 4.38 M/UL — SIGNIFICANT CHANGE UP (ref 4.2–5.8)
RBC # FLD: 15.9 % — HIGH (ref 10.3–14.5)
S AUREUS DNA NOSE QL NAA+PROBE: DETECTED
SODIUM SERPL-SCNC: 138 MMOL/L — SIGNIFICANT CHANGE UP (ref 135–145)
WBC # BLD: 3.93 K/UL — SIGNIFICANT CHANGE UP (ref 3.8–10.5)
WBC # FLD AUTO: 3.93 K/UL — SIGNIFICANT CHANGE UP (ref 3.8–10.5)

## 2023-04-17 PROCEDURE — 99233 SBSQ HOSP IP/OBS HIGH 50: CPT

## 2023-04-17 RX ORDER — NICOTINE POLACRILEX 2 MG
1 GUM BUCCAL DAILY
Refills: 0 | Status: DISCONTINUED | OUTPATIENT
Start: 2023-04-17 | End: 2023-04-19

## 2023-04-17 RX ORDER — MUPIROCIN 20 MG/G
1 OINTMENT TOPICAL
Refills: 0 | Status: DISCONTINUED | OUTPATIENT
Start: 2023-04-17 | End: 2023-04-19

## 2023-04-17 RX ORDER — NICOTINE POLACRILEX 2 MG
2 GUM BUCCAL
Refills: 0 | Status: DISCONTINUED | OUTPATIENT
Start: 2023-04-17 | End: 2023-04-18

## 2023-04-17 RX ORDER — CHLORHEXIDINE GLUCONATE 213 G/1000ML
1 SOLUTION TOPICAL DAILY
Refills: 0 | Status: DISCONTINUED | OUTPATIENT
Start: 2023-04-17 | End: 2023-04-19

## 2023-04-17 RX ADMIN — Medication 6 MILLIGRAM(S): at 21:46

## 2023-04-17 RX ADMIN — PANTOPRAZOLE SODIUM 40 MILLIGRAM(S): 20 TABLET, DELAYED RELEASE ORAL at 12:12

## 2023-04-17 RX ADMIN — ENOXAPARIN SODIUM 40 MILLIGRAM(S): 100 INJECTION SUBCUTANEOUS at 12:12

## 2023-04-17 RX ADMIN — MUPIROCIN 1 APPLICATION(S): 20 OINTMENT TOPICAL at 19:04

## 2023-04-17 RX ADMIN — Medication 650 MILLIGRAM(S): at 00:15

## 2023-04-17 RX ADMIN — CHLORHEXIDINE GLUCONATE 1 APPLICATION(S): 213 SOLUTION TOPICAL at 12:12

## 2023-04-17 NOTE — BH CONSULTATION LIAISON PROGRESS NOTE - NSBHATTESTCOMMENTATTENDFT_PSY_A_CORE
Chart reviewed, pt. seen/evaluated with trainee, I agree with above assessment/plan, pt. alert, oriented, superficially cooperative, extremely guarded and significantly minimizing his overdose, reports that he overdosed on his mother's medication, further reports that it was an impulsive attempt, denies current SI and HI, patient with poor insight into severity of impulsive behavior and high risk for suicide. Plan as above, will follow

## 2023-04-17 NOTE — PROGRESS NOTE ADULT - ASSESSMENT
22 y/o M w/ a PMHx of gastric sleeve surgery, ADHD, bipolar d/o, depression who p/w intentional OD of oxycodone/tylenol and xanax at 4/14 8:45 pm for a suicide attempt. He ingested oxycodone/tylenol 325mg/5mg x 40 and xanax (dosage unknown) x20. Intubated for airway protection, admitted to the MICU. Extubated 4/15 and transferred to [facility] 4/16. Currently feeling well without complaint. In police custody awaiting arraignment.    # SI with intentional drug overdose  - in setting of bipolar disorder, depresion  - s/p extubation, no respiratory symptoms or signs of distress  - monitor for benzo/opioid withdrawal (no current symptoms or signs)  - trend CMP    # ADHD  - continue to hold home Adderall  - prn Ativan available for agitation, has not required  - will f/u psychiatry recs  - SBIRT consult   - 1:1 constant observation, PD at bedside as well    # Hx of gastric sleeve  - no current vomiting  - PRN Zofran available  - Protonix 40 mg IV once daily    # normocytic anemia  - consider iron studies    # DVT PPX  - Lovenox 40 mg
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
## 2023-04-17 NOTE — PROGRESS NOTE ADULT - SUBJECTIVE AND OBJECTIVE BOX
Patient is a 23y old  Male who presents with a chief complaint of Intentional overdose (16 Apr 2023 07:22)      SUBJECTIVE / OVERNIGHT EVENTS: No acute overnight event. Pt states he feels well. Denies pain, SOB, nausea, or other complaint. Tolerating diet well.    MEDICATIONS  (STANDING):  chlorhexidine 2% Cloths 1 Application(s) Topical daily  enoxaparin Injectable 40 milliGRAM(s) SubCutaneous every 24 hours  melatonin 6 milliGRAM(s) Oral at bedtime  pantoprazole  Injectable 40 milliGRAM(s) IV Push daily    MEDICATIONS  (PRN):  LORazepam   Injectable 1 milliGRAM(s) IV Push every 6 hours PRN Agitation  ondansetron Injectable 4 milliGRAM(s) IV Push every 8 hours PRN Nausea and/or Vomiting      CAPILLARY BLOOD GLUCOSE        I&O's Summary    16 Apr 2023 07:01  -  17 Apr 2023 07:00  --------------------------------------------------------  IN: 0 mL / OUT: 350 mL / NET: -350 mL        PHYSICAL EXAM:  Vital Signs Last 24 Hrs  T(C): 36.6 (17 Apr 2023 12:08), Max: 36.8 (16 Apr 2023 21:07)  T(F): 97.8 (17 Apr 2023 12:08), Max: 98.3 (16 Apr 2023 21:07)  HR: 69 (17 Apr 2023 12:08) (55 - 69)  BP: 96/59 (17 Apr 2023 12:08) (93/62 - 106/69)  BP(mean): --  RR: 17 (17 Apr 2023 12:08) (17 - 18)  SpO2: 97% (17 Apr 2023 12:08) (97% - 100%)    Parameters below as of 17 Apr 2023 12:08  Patient On (Oxygen Delivery Method): room air      CONSTITUTIONAL: NAD, well-developed, well-groomed  EYES: PERRLA; conjunctiva and sclera clear  ENMT: Moist oral mucosa, no pharyngeal injection or exudates; normal dentition  NECK: Supple, no palpable masses; no thyromegaly  RESPIRATORY: Normal respiratory effort; lungs are clear to auscultation bilaterally  CARDIOVASCULAR: Regular rate and rhythm, normal S1 and S2, no murmur/rub/gallop; No lower extremity edema; Peripheral pulses are 2+ bilaterally  ABDOMEN: Nontender to palpation, normoactive bowel sounds, no rebound/guarding; No hepatosplenomegaly  MUSCULOSKELETAL:  no clubbing or cyanosis of digits; no joint swelling or tenderness to palpation  PSYCH: A+O to person, place, and time; affect appropriate  NEUROLOGY: CN 2-12 are intact and symmetric; no gross sensory deficits   SKIN: No rashes; no palpable lesions    LABS:                        11.7   3.93  )-----------( 199      ( 17 Apr 2023 05:47 )             37.1     04-17    138  |  106  |  12  ----------------------------<  134<H>  4.1   |  23  |  0.83    Ca    9.2      17 Apr 2023 05:47  Phos  3.2     04-17  Mg     2.10     04-17    TPro  6.2  /  Alb  4.0  /  TBili  <0.2  /  DBili  x   /  AST  14  /  ALT  10  /  AlkPhos  59  04-17    PT/INR - ( 16 Apr 2023 00:40 )   PT: 13.3 sec;   INR: 1.14 ratio         PTT - ( 16 Apr 2023 00:40 )  PTT:32.1 sec            RADIOLOGY & ADDITIONAL TESTS:  Results Reviewed:   Imaging Personally Reviewed:  Electrocardiogram Personally Reviewed:    COORDINATION OF CARE:  Care Discussed with Consultants/Other Providers [Y/N]:  Prior or Outpatient Records Reviewed [Y/N]:
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
## 2023-04-18 LAB
ALBUMIN SERPL ELPH-MCNC: 3.6 G/DL — SIGNIFICANT CHANGE UP (ref 3.3–5)
ALP SERPL-CCNC: 53 U/L — SIGNIFICANT CHANGE UP (ref 40–120)
ALT FLD-CCNC: 5 U/L — SIGNIFICANT CHANGE UP (ref 4–41)
ANION GAP SERPL CALC-SCNC: 10 MMOL/L — SIGNIFICANT CHANGE UP (ref 7–14)
AST SERPL-CCNC: 12 U/L — SIGNIFICANT CHANGE UP (ref 4–40)
BILIRUB SERPL-MCNC: 0.3 MG/DL — SIGNIFICANT CHANGE UP (ref 0.2–1.2)
BUN SERPL-MCNC: 9 MG/DL — SIGNIFICANT CHANGE UP (ref 7–23)
CALCIUM SERPL-MCNC: 9 MG/DL — SIGNIFICANT CHANGE UP (ref 8.4–10.5)
CHLORIDE SERPL-SCNC: 102 MMOL/L — SIGNIFICANT CHANGE UP (ref 98–107)
CO2 SERPL-SCNC: 25 MMOL/L — SIGNIFICANT CHANGE UP (ref 22–31)
CREAT SERPL-MCNC: 0.8 MG/DL — SIGNIFICANT CHANGE UP (ref 0.5–1.3)
EGFR: 127 ML/MIN/1.73M2 — SIGNIFICANT CHANGE UP
FERRITIN SERPL-MCNC: 40 NG/ML — SIGNIFICANT CHANGE UP (ref 30–400)
GLUCOSE SERPL-MCNC: 81 MG/DL — SIGNIFICANT CHANGE UP (ref 70–99)
HCT VFR BLD CALC: 34.6 % — LOW (ref 39–50)
HGB BLD-MCNC: 10.6 G/DL — LOW (ref 13–17)
IRON SATN MFR SERPL: 17 % — SIGNIFICANT CHANGE UP (ref 14–50)
IRON SATN MFR SERPL: 35 UG/DL — LOW (ref 45–165)
MAGNESIUM SERPL-MCNC: 2 MG/DL — SIGNIFICANT CHANGE UP (ref 1.6–2.6)
MCHC RBC-ENTMCNC: 25.9 PG — LOW (ref 27–34)
MCHC RBC-ENTMCNC: 30.6 GM/DL — LOW (ref 32–36)
MCV RBC AUTO: 84.6 FL — SIGNIFICANT CHANGE UP (ref 80–100)
NRBC # BLD: 0 /100 WBCS — SIGNIFICANT CHANGE UP (ref 0–0)
NRBC # FLD: 0 K/UL — SIGNIFICANT CHANGE UP (ref 0–0)
PLATELET # BLD AUTO: 211 K/UL — SIGNIFICANT CHANGE UP (ref 150–400)
POTASSIUM SERPL-MCNC: 4.3 MMOL/L — SIGNIFICANT CHANGE UP (ref 3.5–5.3)
POTASSIUM SERPL-SCNC: 4.3 MMOL/L — SIGNIFICANT CHANGE UP (ref 3.5–5.3)
PROT SERPL-MCNC: 6.1 G/DL — SIGNIFICANT CHANGE UP (ref 6–8.3)
RBC # BLD: 4.09 M/UL — LOW (ref 4.2–5.8)
RBC # FLD: 15.8 % — HIGH (ref 10.3–14.5)
SODIUM SERPL-SCNC: 137 MMOL/L — SIGNIFICANT CHANGE UP (ref 135–145)
TIBC SERPL-MCNC: 206 UG/DL — LOW (ref 220–430)
UIBC SERPL-MCNC: 171 UG/DL — SIGNIFICANT CHANGE UP (ref 110–370)
WBC # BLD: 4.65 K/UL — SIGNIFICANT CHANGE UP (ref 3.8–10.5)
WBC # FLD AUTO: 4.65 K/UL — SIGNIFICANT CHANGE UP (ref 3.8–10.5)

## 2023-04-18 PROCEDURE — 99233 SBSQ HOSP IP/OBS HIGH 50: CPT

## 2023-04-18 RX ORDER — NICOTINE POLACRILEX 2 MG
4 GUM BUCCAL EVERY 6 HOURS
Refills: 0 | Status: DISCONTINUED | OUTPATIENT
Start: 2023-04-18 | End: 2023-04-19

## 2023-04-18 RX ADMIN — PANTOPRAZOLE SODIUM 40 MILLIGRAM(S): 20 TABLET, DELAYED RELEASE ORAL at 13:02

## 2023-04-18 RX ADMIN — MUPIROCIN 1 APPLICATION(S): 20 OINTMENT TOPICAL at 17:39

## 2023-04-18 RX ADMIN — ENOXAPARIN SODIUM 40 MILLIGRAM(S): 100 INJECTION SUBCUTANEOUS at 13:02

## 2023-04-18 RX ADMIN — MUPIROCIN 1 APPLICATION(S): 20 OINTMENT TOPICAL at 05:25

## 2023-04-18 RX ADMIN — CHLORHEXIDINE GLUCONATE 1 APPLICATION(S): 213 SOLUTION TOPICAL at 13:01

## 2023-04-18 RX ADMIN — Medication 4 MILLIGRAM(S): at 18:41

## 2023-04-18 RX ADMIN — Medication 6 MILLIGRAM(S): at 21:26

## 2023-04-18 RX ADMIN — Medication 1 PATCH: at 13:01

## 2023-04-18 RX ADMIN — Medication 1 PATCH: at 20:28
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
## 2023-04-18 NOTE — PROGRESS NOTE ADULT - ASSESSMENT
24 y/o M w/ a PMHx of gastric sleeve surgery, ADHD, bipolar d/o, depression who p/w intentional OD of oxycodone/tylenol and xanax at 4/14 8:45 pm for a suicide attempt. He ingested oxycodone/tylenol 325mg/5mg x 40 and xanax (dosage unknown) x20. Intubated for airway protection, admitted to the MICU. Extubated 4/15 and transferred to [facility] 4/16. Currently feeling well without complaint. In police custody awaiting arraignment.    # SI with intentional drug overdose  - in setting of bipolar disorder, depression  - s/p extubation, no respiratory symptoms or signs of distress  - monitor for benzo/opioid withdrawal (no current symptoms or signs) -- d/c CIWA today given low scores  - trend CMP  - appreciate psychiatry recs, anticipate need for psych admission    # ADHD  - continue to hold home Adderall  - prn Ativan available for agitation, has not required  - appreciate psychiatry recs  - SBIRT consult   - 1:1 constant observation, PD at bedside as well    # Hx of gastric sleeve  - no current vomiting  - PRN Zofran available  - Protonix 40 mg IV once daily    # normocytic anemia  - consider iron studies    # DVT PPX  - Lovenox 40 mg

## 2023-04-18 NOTE — PROGRESS NOTE ADULT - SUBJECTIVE AND OBJECTIVE BOX
Patient is a 23y old  Male who presents with a chief complaint of Intentional overdose (17 Apr 2023 12:19)      SUBJECTIVE / OVERNIGHT EVENTS: No overnight event. Resting comfortably.    MEDICATIONS  (STANDING):  chlorhexidine 2% Cloths 1 Application(s) Topical daily  enoxaparin Injectable 40 milliGRAM(s) SubCutaneous every 24 hours  melatonin 6 milliGRAM(s) Oral at bedtime  mupirocin 2% Ointment 1 Application(s) Topical two times a day  nicotine - 21 mG/24Hr(s) Patch 1 Patch Transdermal daily  pantoprazole  Injectable 40 milliGRAM(s) IV Push daily    MEDICATIONS  (PRN):  LORazepam     Tablet 2 milliGRAM(s) Oral every 2 hours PRN CIWA-Ar score increase by 2 points and a total score of 7 or less  LORazepam     Tablet 2 milliGRAM(s) Oral every 2 hours PRN Symptom-triggered 2 point increase in CIWA-Ar  LORazepam   Injectable 2 milliGRAM(s) IV Push every 1 hour PRN CIWA-Ar score 8 or greater  nicotine  Polacrilex Gum 2 milliGRAM(s) Oral every 2 hours PRN breakthrough cravings  ondansetron Injectable 4 milliGRAM(s) IV Push every 8 hours PRN Nausea and/or Vomiting      CAPILLARY BLOOD GLUCOSE        I&O's Summary    17 Apr 2023 07:01  -  18 Apr 2023 07:00  --------------------------------------------------------  IN: 900 mL / OUT: 0 mL / NET: 900 mL        PHYSICAL EXAM:  Vital Signs Last 24 Hrs  T(C): 36.7 (18 Apr 2023 07:50), Max: 36.9 (17 Apr 2023 21:41)  T(F): 98 (18 Apr 2023 07:50), Max: 98.5 (18 Apr 2023 05:22)  HR: 57 (18 Apr 2023 07:50) (56 - 69)  BP: 95/56 (18 Apr 2023 07:50) (95/56 - 106/69)  BP(mean): --  RR: 17 (18 Apr 2023 07:50) (17 - 18)  SpO2: 100% (18 Apr 2023 07:50) (97% - 100%)    Parameters below as of 18 Apr 2023 07:50  Patient On (Oxygen Delivery Method): room air      CONSTITUTIONAL: NAD, well-developed, well-groomed  EYES: PERRLA; conjunctiva and sclera clear  ENMT: Moist oral mucosa, no pharyngeal injection or exudates; normal dentition  NECK: Supple, no palpable masses; no thyromegaly  RESPIRATORY: Normal respiratory effort; lungs are clear to auscultation bilaterally  CARDIOVASCULAR: Regular rate and rhythm, normal S1 and S2, no murmur/rub/gallop; No lower extremity edema; Peripheral pulses are 2+ bilaterally  ABDOMEN: Nontender to palpation, normoactive bowel sounds, no rebound/guarding; No hepatosplenomegaly  MUSCULOSKELETAL:  no clubbing or cyanosis of digits; no joint swelling or tenderness to palpation  PSYCH: A+O to person, place, and time; affect appropriate  NEUROLOGY: CN 2-12 are intact and symmetric; no gross sensory deficits   SKIN: No rashes; no palpable lesions    LABS:                        10.6   4.65  )-----------( 211      ( 18 Apr 2023 05:10 )             34.6     04-18    137  |  102  |  9   ----------------------------<  81  4.3   |  25  |  0.80    Ca    9.0      18 Apr 2023 05:10  Phos  3.2     04-17  Mg     2.00     04-18    TPro  6.1  /  Alb  3.6  /  TBili  0.3  /  DBili  x   /  AST  12  /  ALT  5   /  AlkPhos  53  04-18                RADIOLOGY & ADDITIONAL TESTS:  Results Reviewed:   Imaging Personally Reviewed:  Electrocardiogram Personally Reviewed:    COORDINATION OF CARE:  Care Discussed with Consultants/Other Providers [Y/N]:  Prior or Outpatient Records Reviewed [Y/N]:

## 2023-04-18 NOTE — BH CONSULTATION LIAISON PROGRESS NOTE - NSBHFUPINTERVALHXFT_PSY_A_CORE
Patient was seen at beside, handcuffed to bed with . Patient states he is doing well. Slept okay last night. Asking for nicotine gum for nicotine dependency. Denies any w/d symptoms today. No SI, HI or AVH. Patient spoke to mother this morning and states relationship is doing well. Asked if he remembers any of his previous meds or outpatient psychiatrist but denies. Said he has d/c psychiatric medications for a month due to psychiatrist being on vacation and restarted it recently prior to coming to hospital.  Patient was seen at beside, handcuffed to bed with . Patient states he is doing well. Slept okay last night. Asking for nicotine gum for nicotine dependency. Denies any w/d symptoms today. No SI, HI or AVH. Patient reports he spoke to mother this morning and states relationship is doing well. Asked if he remembers any of his previous meds or outpatient psychiatrist but denies. Said he has d/c psychiatric medications for a month due to psychiatrist being on vacation and restarted it recently prior to coming to hospital.

## 2023-04-18 NOTE — BH CONSULTATION LIAISON PROGRESS NOTE - NSBHATTESTCOMMENTATTENDFT_PSY_A_CORE
Chart reviewed, pt. seen/evaluated with trainee, I agree with above assessment/plan, presentation largely unchanged from yesterday, pt. remains guarded,  superficially cooperative, significantly minimizing his overdose, denies current SI and HI, patient with poor insight into severity of impulsive behavior and remains at high risk for suicide. Denies SI and HI. Plan as above. Will follow

## 2023-04-19 ENCOUNTER — INPATIENT (INPATIENT)
Facility: HOSPITAL | Age: 23
LOS: 12 days | Discharge: HOME | End: 2023-05-02
Attending: PSYCHIATRY & NEUROLOGY | Admitting: PSYCHIATRY & NEUROLOGY
Payer: COMMERCIAL

## 2023-04-19 ENCOUNTER — TRANSCRIPTION ENCOUNTER (OUTPATIENT)
Age: 23
End: 2023-04-19

## 2023-04-19 VITALS
OXYGEN SATURATION: 98 % | DIASTOLIC BLOOD PRESSURE: 70 MMHG | SYSTOLIC BLOOD PRESSURE: 120 MMHG | TEMPERATURE: 98 F | RESPIRATION RATE: 18 BRPM | HEART RATE: 72 BPM

## 2023-04-19 VITALS — TEMPERATURE: 97 F | WEIGHT: 158.95 LBS | HEIGHT: 72 IN

## 2023-04-19 DIAGNOSIS — F90.9 ATTENTION-DEFICIT HYPERACTIVITY DISORDER, UNSPECIFIED TYPE: ICD-10-CM

## 2023-04-19 DIAGNOSIS — F33.9 MAJOR DEPRESSIVE DISORDER, RECURRENT, UNSPECIFIED: ICD-10-CM

## 2023-04-19 DIAGNOSIS — Z98.84 BARIATRIC SURGERY STATUS: Chronic | ICD-10-CM

## 2023-04-19 LAB — SARS-COV-2 RNA SPEC QL NAA+PROBE: SIGNIFICANT CHANGE UP

## 2023-04-19 PROCEDURE — 99233 SBSQ HOSP IP/OBS HIGH 50: CPT

## 2023-04-19 PROCEDURE — 99223 1ST HOSP IP/OBS HIGH 75: CPT

## 2023-04-19 RX ORDER — HYDROXYZINE HCL 10 MG
50 TABLET ORAL EVERY 6 HOURS
Refills: 0 | Status: DISCONTINUED | OUTPATIENT
Start: 2023-04-19 | End: 2023-04-19

## 2023-04-19 RX ORDER — CHLORHEXIDINE GLUCONATE 213 G/1000ML
1 SOLUTION TOPICAL
Refills: 0 | Status: DISCONTINUED | OUTPATIENT
Start: 2023-04-19 | End: 2023-04-19

## 2023-04-19 RX ORDER — HYDROXYZINE HCL 10 MG
25 TABLET ORAL EVERY 8 HOURS
Refills: 0 | Status: DISCONTINUED | OUTPATIENT
Start: 2023-04-19 | End: 2023-04-19

## 2023-04-19 RX ORDER — HYDROXYZINE HCL 10 MG
25 TABLET ORAL EVERY 8 HOURS
Refills: 0 | Status: DISCONTINUED | OUTPATIENT
Start: 2023-04-19 | End: 2023-05-02

## 2023-04-19 RX ORDER — MUPIROCIN 20 MG/G
1 OINTMENT TOPICAL
Refills: 0 | Status: DISCONTINUED | OUTPATIENT
Start: 2023-04-19 | End: 2023-05-02

## 2023-04-19 RX ORDER — LANOLIN ALCOHOL/MO/W.PET/CERES
6 CREAM (GRAM) TOPICAL AT BEDTIME
Refills: 0 | Status: DISCONTINUED | OUTPATIENT
Start: 2023-04-19 | End: 2023-05-02

## 2023-04-19 RX ORDER — LANOLIN ALCOHOL/MO/W.PET/CERES
2 CREAM (GRAM) TOPICAL
Qty: 0 | Refills: 0 | DISCHARGE
Start: 2023-04-19

## 2023-04-19 RX ORDER — NICOTINE POLACRILEX 2 MG
4 GUM BUCCAL
Qty: 0 | Refills: 0 | DISCHARGE
Start: 2023-04-19

## 2023-04-19 RX ORDER — NICOTINE POLACRILEX 2 MG
4 GUM BUCCAL
Refills: 0 | Status: DISCONTINUED | OUTPATIENT
Start: 2023-04-19 | End: 2023-05-02

## 2023-04-19 RX ORDER — DIPHENHYDRAMINE HCL 50 MG
50 CAPSULE ORAL ONCE
Refills: 0 | Status: COMPLETED | OUTPATIENT
Start: 2023-04-19 | End: 2023-04-19

## 2023-04-19 RX ORDER — DEXTROAMPHETAMINE SACCHARATE, AMPHETAMINE ASPARTATE, DEXTROAMPHETAMINE SULFATE AND AMPHETAMINE SULFATE 1.875; 1.875; 1.875; 1.875 MG/1; MG/1; MG/1; MG/1
1 TABLET ORAL
Qty: 0 | Refills: 0 | DISCHARGE

## 2023-04-19 RX ORDER — NICOTINE POLACRILEX 2 MG
1 GUM BUCCAL DAILY
Refills: 0 | Status: DISCONTINUED | OUTPATIENT
Start: 2023-04-19 | End: 2023-05-02

## 2023-04-19 RX ORDER — NICOTINE POLACRILEX 2 MG
0 GUM BUCCAL
Qty: 0 | Refills: 0 | DISCHARGE
Start: 2023-04-19

## 2023-04-19 RX ORDER — HYDROXYZINE HCL 10 MG
1 TABLET ORAL
Qty: 0 | Refills: 0 | DISCHARGE
Start: 2023-04-19

## 2023-04-19 RX ORDER — PANTOPRAZOLE SODIUM 20 MG/1
40 TABLET, DELAYED RELEASE ORAL
Refills: 0 | Status: DISCONTINUED | OUTPATIENT
Start: 2023-04-19 | End: 2023-05-02

## 2023-04-19 RX ADMIN — Medication 1 MILLIGRAM(S): at 01:44

## 2023-04-19 RX ADMIN — Medication 50 MILLIGRAM(S): at 22:14

## 2023-04-19 RX ADMIN — Medication 1 PATCH: at 16:53

## 2023-04-19 RX ADMIN — Medication 1 PATCH: at 11:37

## 2023-04-19 RX ADMIN — CHLORHEXIDINE GLUCONATE 1 APPLICATION(S): 213 SOLUTION TOPICAL at 11:38

## 2023-04-19 RX ADMIN — Medication 4 MILLIGRAM(S): at 08:14

## 2023-04-19 RX ADMIN — Medication 1 PATCH: at 13:53

## 2023-04-19 RX ADMIN — MUPIROCIN 1 APPLICATION(S): 20 OINTMENT TOPICAL at 17:06

## 2023-04-19 RX ADMIN — PANTOPRAZOLE SODIUM 40 MILLIGRAM(S): 20 TABLET, DELAYED RELEASE ORAL at 11:37

## 2023-04-19 RX ADMIN — ENOXAPARIN SODIUM 40 MILLIGRAM(S): 100 INJECTION SUBCUTANEOUS at 11:38

## 2023-04-19 RX ADMIN — Medication 4 MILLIGRAM(S): at 16:49

## 2023-04-19 RX ADMIN — MUPIROCIN 1 APPLICATION(S): 20 OINTMENT TOPICAL at 06:28

## 2023-04-19 NOTE — BH INPATIENT PSYCHIATRY ASSESSMENT NOTE - NSSUICPROTFACT_PSY_ALL_CORE
Identifies reasons for living Identifies reasons for living/Engaged in work or school/Ability to cope with stress
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
## 2023-04-19 NOTE — BH INPATIENT PSYCHIATRY ASSESSMENT NOTE - MSE UNSTRUCTURED FT
On exam today the patient is calm and cooperative. Patient has fair grooming and good hygiene. Visible tattoos and body modification.  Speech is clear and of normal rate.    Thought process: largely linear, occasionally circumstantial  Thought content: without leonidas delusions, ruminations, preoccupations. +minimizing  Perception: Denies auditory hallucinations or other perceptual disturbances  Mood: Describes as "nervous" Affect: neutral, stable   Patient denies active suicidal ideation, intention and plan.    Patient denies active aggressive/homicidal ideation, intent or plan.   Patient is alert and oriented .   Fund of knowledge is fair. Memory is intact.  Insight and judgment are poor.   Impulse control is intact at this time.

## 2023-04-19 NOTE — DISCHARGE NOTE PROVIDER - NSDCMRMEDTOKEN_GEN_ALL_CORE_FT
hydrOXYzine hydrochloride 50 mg oral tablet: 1 tab(s) orally every 6 hours As needed Anxiety  melatonin 3 mg oral tablet: 2 tab(s) orally once a day (at bedtime)  nicotine: 4 milligram(s) buccal every 2 to 3 hours as needed for nicotine dependence  nicotine 21 mg/24 hr transdermal film, extended release: transdermal once a day  omeprazole 40 mg oral delayed release capsule: 1 cap(s) orally once a day
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
## 2023-04-19 NOTE — BH INPATIENT PSYCHIATRY ASSESSMENT NOTE - CURRENT MEDICATION
MEDICATIONS  (STANDING):    MEDICATIONS  (PRN):   MEDICATIONS  (STANDING):  hydrOXYzine hydrochloride 25 milliGRAM(s) Oral every 8 hours  melatonin. 6 milliGRAM(s) Oral at bedtime  nicotine - 21 mG/24Hr(s) Patch 1 Patch Transdermal daily  pantoprazole    Tablet 40 milliGRAM(s) Oral before breakfast    MEDICATIONS  (PRN):  aluminum hydroxide/magnesium hydroxide/simethicone Suspension 30 milliLiter(s) Oral every 4 hours PRN Dyspepsia  chlorhexidine 2% Cloths 1 Application(s) Topical two times a day PRN rash  LORazepam     Tablet 2 milliGRAM(s) Oral every 4 hours PRN agitation  LORazepam   Injectable 2 milliGRAM(s) IntraMuscular every 4 hours PRN Agitation  mupirocin 2% Ointment 1 Application(s) Topical two times a day PRN rash  nicotine  Polacrilex Gum 4 milliGRAM(s) Oral every 2 hours PRN breakthrough cravings   MEDICATIONS  (STANDING):  melatonin. 6 milliGRAM(s) Oral at bedtime  nicotine - 21 mG/24Hr(s) Patch 1 Patch Transdermal daily  pantoprazole    Tablet 40 milliGRAM(s) Oral before breakfast    MEDICATIONS  (PRN):  aluminum hydroxide/magnesium hydroxide/simethicone Suspension 30 milliLiter(s) Oral every 4 hours PRN Dyspepsia  LORazepam     Tablet 2 milliGRAM(s) Oral every 4 hours PRN agitation  LORazepam   Injectable 2 milliGRAM(s) IntraMuscular every 4 hours PRN Agitation  mupirocin 2% Ointment 1 Application(s) Topical two times a day PRN rash  nicotine  Polacrilex Gum 4 milliGRAM(s) Oral every 2 hours PRN breakthrough cravings

## 2023-04-19 NOTE — BH CONSULTATION LIAISON PROGRESS NOTE - NSBHATTESTSTAFFAMEND_PSY_A_CORE
shortness of breath
I have personally seen and examined this patient. I fully participated in the care of this patient. I have made amendments to the documentation where appropriate and otherwise agree with the history, physical exam, and plan as documented by the

## 2023-04-19 NOTE — DISCHARGE NOTE NURSING/CASE MANAGEMENT/SOCIAL WORK - NSDCPEFALRISK_GEN_ALL_CORE
For information on Fall & Injury Prevention, visit: [email or web address] OR  [email or web address] OR  https://www.cdc.gov/steadi/patient.html

## 2023-04-19 NOTE — BH CONSULTATION LIAISON PROGRESS NOTE - CURRENT MEDICATION
MEDICATIONS  (STANDING):  chlorhexidine 2% Cloths 1 Application(s) Topical daily  enoxaparin Injectable 40 milliGRAM(s) SubCutaneous every 24 hours  melatonin 6 milliGRAM(s) Oral at bedtime  mupirocin 2% Ointment 1 Application(s) Topical two times a day  nicotine - 21 mG/24Hr(s) Patch 1 Patch Transdermal daily  pantoprazole  Injectable 40 milliGRAM(s) IV Push daily    MEDICATIONS  (PRN):  LORazepam     Tablet 2 milliGRAM(s) Oral every 2 hours PRN Symptom-triggered 2 point increase in CIWA-Ar  LORazepam     Tablet 2 milliGRAM(s) Oral every 2 hours PRN CIWA-Ar score increase by 2 points and a total score of 7 or less  LORazepam   Injectable 2 milliGRAM(s) IV Push every 1 hour PRN CIWA-Ar score 8 or greater  nicotine  Polacrilex Gum 2 milliGRAM(s) Oral every 2 hours PRN breakthrough cravings  ondansetron Injectable 4 milliGRAM(s) IV Push every 8 hours PRN Nausea and/or Vomiting  
MEDICATIONS  (STANDING):  chlorhexidine 2% Cloths 1 Application(s) Topical daily  enoxaparin Injectable 40 milliGRAM(s) SubCutaneous every 24 hours  melatonin 6 milliGRAM(s) Oral at bedtime  pantoprazole  Injectable 40 milliGRAM(s) IV Push daily    MEDICATIONS  (PRN):  LORazepam   Injectable 1 milliGRAM(s) IV Push every 6 hours PRN Agitation  ondansetron Injectable 4 milliGRAM(s) IV Push every 8 hours PRN Nausea and/or Vomiting  
MEDICATIONS  (STANDING):  chlorhexidine 2% Cloths 1 Application(s) Topical daily  enoxaparin Injectable 40 milliGRAM(s) SubCutaneous every 24 hours  melatonin 6 milliGRAM(s) Oral at bedtime  mupirocin 2% Ointment 1 Application(s) Topical two times a day  nicotine - 21 mG/24Hr(s) Patch 1 Patch Transdermal daily  pantoprazole  Injectable 40 milliGRAM(s) IV Push daily    MEDICATIONS  (PRN):  nicotine  Polacrilex Gum 4 milliGRAM(s) Oral every 6 hours PRN smoking cessation  ondansetron Injectable 4 milliGRAM(s) IV Push every 8 hours PRN Nausea and/or Vomiting
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
## 2023-04-19 NOTE — BH CONSULTATION LIAISON PROGRESS NOTE - NSBHCHARTREVIEWVS_PSY_A_CORE FT
Vital Signs Last 24 Hrs  T(C): 36.7 (18 Apr 2023 07:50), Max: 36.9 (17 Apr 2023 21:41)  T(F): 98 (18 Apr 2023 07:50), Max: 98.5 (18 Apr 2023 05:22)  HR: 57 (18 Apr 2023 07:50) (56 - 69)  BP: 95/56 (18 Apr 2023 07:50) (95/56 - 106/69)  BP(mean): --  RR: 17 (18 Apr 2023 07:50) (17 - 18)  SpO2: 100% (18 Apr 2023 07:50) (97% - 100%)    Parameters below as of 18 Apr 2023 07:50  Patient On (Oxygen Delivery Method): room air    
Vital Signs Last 24 Hrs  T(C): 36.6 (17 Apr 2023 12:08), Max: 36.8 (16 Apr 2023 21:07)  T(F): 97.8 (17 Apr 2023 12:08), Max: 98.3 (16 Apr 2023 21:07)  HR: 69 (17 Apr 2023 12:08) (55 - 69)  BP: 96/59 (17 Apr 2023 12:08) (93/62 - 106/69)  BP(mean): --  RR: 17 (17 Apr 2023 12:08) (17 - 18)  SpO2: 97% (17 Apr 2023 12:08) (97% - 100%)    Parameters below as of 17 Apr 2023 12:08  Patient On (Oxygen Delivery Method): room air      
Vital Signs Last 24 Hrs  T(C): 36.5 (19 Apr 2023 06:31), Max: 36.7 (18 Apr 2023 21:27)  T(F): 97.7 (19 Apr 2023 06:31), Max: 98 (18 Apr 2023 21:27)  HR: 67 (19 Apr 2023 06:31) (64 - 82)  BP: 101/67 (19 Apr 2023 06:31) (101/67 - 130/91)  BP(mean): --  RR: 17 (19 Apr 2023 06:31) (17 - 18)  SpO2: 100% (19 Apr 2023 06:31) (98% - 100%)    Parameters below as of 19 Apr 2023 06:31  Patient On (Oxygen Delivery Method): room air

## 2023-04-19 NOTE — BH INPATIENT PSYCHIATRY ASSESSMENT NOTE - NSBHCHARTREVIEWVS_PSY_A_CORE FT
Vital Signs Last 24 Hrs  T(C): 36.6 (04-19-23 @ 13:00), Max: 36.7 (04-18-23 @ 21:27)  T(F): 97.9 (04-19-23 @ 13:00), Max: 98 (04-18-23 @ 21:27)  HR: 72 (04-19-23 @ 13:00) (64 - 82)  BP: 120/70 (04-19-23 @ 13:00) (101/67 - 130/91)  BP(mean): --  RR: 18 (04-19-23 @ 13:00) (17 - 18)  SpO2: 98% (04-19-23 @ 13:00) (98% - 100%)     Vital Signs Last 24 Hrs  T(C): 36.3 (04-19-23 @ 20:36), Max: 36.7 (04-18-23 @ 21:27)  T(F): 97.3 (04-19-23 @ 20:36), Max: 98 (04-18-23 @ 21:27)  HR: 72 (04-19-23 @ 13:00) (64 - 82)  BP: 120/70 (04-19-23 @ 13:00) (101/67 - 130/91)  BP(mean): --  RR: 18 (04-19-23 @ 13:00) (17 - 18)  SpO2: 98% (04-19-23 @ 13:00) (98% - 100%)    Orthostatic VS  04-19-23 @ 20:36  Lying BP: --/-- HR: --  Sitting BP: 122/88 HR: 85  Standing BP: 121/85 HR: 102  Site: upper left arm  Mode: --   Vital Signs Last 24 Hrs  T(C): 36.3 (04-19-23 @ 20:36), Max: 36.6 (04-19-23 @ 13:00)  T(F): 97.3 (04-19-23 @ 20:36), Max: 97.9 (04-19-23 @ 13:00)  HR: 72 (04-19-23 @ 13:00) (64 - 72)  BP: 120/70 (04-19-23 @ 13:00) (101/67 - 130/91)  BP(mean): --  RR: 18 (04-19-23 @ 13:00) (17 - 18)  SpO2: 98% (04-19-23 @ 13:00) (98% - 100%)    Orthostatic VS  04-19-23 @ 20:36  Lying BP: --/-- HR: --  Sitting BP: 122/88 HR: 85  Standing BP: 121/85 HR: 102  Site: upper left arm  Mode: --
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
## 2023-04-19 NOTE — BH CONSULTATION LIAISON PROGRESS NOTE - NSBHATTESTCOMMENTATTENDFT_PSY_A_CORE
Chart reviewed, events noted, pt. seen/evaluated, I agree with above assessment/plan. Patient remains guarded, reports feeling down as he is in the hospital, minimizing his SA and saying, " It was just an impulsive action". Patient with poor insight into severity of his SA requiring MICU admission, remains high risk for suicide. Superficially denies current SI and HI. Not interested in starting any psychiatric medications. Patient does not know his outpatient providers name and asked the team to call his mother to get the information. Plan as above, case d/w Dr. Guzman in person, will follow Chart reviewed, events noted, pt. seen/evaluated, I agree with above assessment/plan. Patient remains guarded, reports feeling down as he is in the hospital, minimizing his SA and saying, " It was just an impulsive action". Patient with poor insight into severity of his SA requiring MICU admission, remains high risk for suicide. Superficially denies current SI and HI. Not interested in starting any psychiatric medications. Patient does not know his outpatient providers name and asked the team to call his mother to get the information. No tremors, sweating on exam. Plan as above, case d/w Dr. Guzman in person, will follow

## 2023-04-19 NOTE — BH PATIENT PROFILE - HOME MEDICATIONS
nicotine , 4 milligram(s) buccal every 2 to 3 hours as needed for nicotine dependence  nicotine 21 mg/24 hr transdermal film, extended release , transdermal once a day  melatonin 3 mg oral tablet , 2 tab(s) orally once a day (at bedtime)  hydrOXYzine hydrochloride 50 mg oral tablet , 1 tab(s) orally every 6 hours As needed Anxiety  omeprazole 40 mg oral delayed release capsule , 1 cap(s) orally once a day

## 2023-04-19 NOTE — DISCHARGE NOTE PROVIDER - HOSPITAL COURSE
22 y/o M w/ a PMHx of gastric sleeve surgery, ADHD, bipolar d/o, depression who p/w intentional OD of oxycodone/tylenol and xanax at 4/14 8:45 pm for a suicide attempt. He ingested oxycodone/tylenol 325mg/5mg x 40 and xanax (dosage unknown) x20. Intubated for airway protection, admitted to the MICU. Extubated 4/15 and transferred to [facility] [place] 4/16. Currently feeling well without complaint. Was in police custody now arraigned.  Has a bed at [facility] 4/19 and will be transferred there as medically stable.  D/W attending.    # SI with intentional drug overdose  - in setting of bipolar disorder, depression  - s/p extubation, no respiratory symptoms or signs of distress  - monitor for benzo/opioid withdrawal (no current symptoms or signs) -- d/c CIWA today given low scores  - trend CMP  - appreciate psychiatry recs, anticipate need for psych admission    # ADHD  - continue to hold home Adderall  - prn Ativan available for agitation, has not required  - appreciate psychiatry recs  - SBIRT consult   - 1:1 constant observation, PD was at bedside as well    # Hx of gastric sleeve  - no current vomiting  - PRN Zofran available  - Protonix 40 mg IV once daily    # normocytic anemia  - stable     24 y/o M w/ a PMHx of gastric sleeve surgery, ADHD, bipolar d/o, depression who p/w intentional OD of oxycodone/tylenol and xanax at 4/14 8:45 pm for a suicide attempt. He ingested oxycodone/tylenol 325mg/5mg x 40 and xanax (dosage unknown) x20. Intubated for airway protection, admitted to the MICU. Extubated 4/15 and transferred to [facility] [place] 4/16. Currently feeling well without complaint. Was in police custody now arraigned.  Has a bed at [facility] 4/19 and will be transferred there as medically stable.  D/W attending.    # SI with intentional drug overdose  - in setting of bipolar disorder, depression  - s/p extubation, no respiratory symptoms or signs of distress  - monitor for benzo/opioid withdrawal (no current symptoms or signs) -- CIWA with consistently low scores  - trend CMP  - appreciate psychiatry recs, anticipate need for psych admission    # ADHD  - continue to hold home Adderall  - prn Ativan available for agitation, has not required  - appreciate psychiatry recs  - SBIRT consult   - 1:1 constant observation, PD was at bedside as well    # Hx of gastric sleeve  - no current vomiting  - PRN Zofran available  - Protonix 40 mg IV once daily    # normocytic anemia  - stable

## 2023-04-19 NOTE — PROGRESS NOTE ADULT - SUBJECTIVE AND OBJECTIVE BOX
Patient is a 23y old  Male who presents with a chief complaint of Intentional overdose (18 Apr 2023 11:36)      SUBJECTIVE / OVERNIGHT EVENTS: Overnight pt reported a brief shaking episode that woke him from sleep and resolved without intervention. This was unwitnessed. No further events. Pt attributes to feeling anxious about his current situation.    MEDICATIONS  (STANDING):  chlorhexidine 2% Cloths 1 Application(s) Topical daily  enoxaparin Injectable 40 milliGRAM(s) SubCutaneous every 24 hours  melatonin 6 milliGRAM(s) Oral at bedtime  mupirocin 2% Ointment 1 Application(s) Topical two times a day  nicotine - 21 mG/24Hr(s) Patch 1 Patch Transdermal daily  pantoprazole  Injectable 40 milliGRAM(s) IV Push daily    MEDICATIONS  (PRN):  hydrOXYzine hydrochloride 50 milliGRAM(s) Oral every 6 hours PRN Anxiety  nicotine  Polacrilex Gum 4 milliGRAM(s) Oral every 6 hours PRN smoking cessation  ondansetron Injectable 4 milliGRAM(s) IV Push every 8 hours PRN Nausea and/or Vomiting      CAPILLARY BLOOD GLUCOSE        I&O's Summary      PHYSICAL EXAM:  Vital Signs Last 24 Hrs  T(C): 36.5 (19 Apr 2023 06:31), Max: 36.7 (18 Apr 2023 21:27)  T(F): 97.7 (19 Apr 2023 06:31), Max: 98 (18 Apr 2023 21:27)  HR: 67 (19 Apr 2023 06:31) (64 - 82)  BP: 101/67 (19 Apr 2023 06:31) (101/67 - 130/91)  BP(mean): --  RR: 17 (19 Apr 2023 06:31) (17 - 18)  SpO2: 100% (19 Apr 2023 06:31) (98% - 100%)    Parameters below as of 19 Apr 2023 06:31  Patient On (Oxygen Delivery Method): room air      CONSTITUTIONAL: NAD, well-developed, well-groomed  EYES: PERRLA; conjunctiva and sclera clear  ENMT: Moist oral mucosa, no pharyngeal injection or exudates; normal dentition  NECK: Supple, no palpable masses; no thyromegaly  RESPIRATORY: Normal respiratory effort; lungs are clear to auscultation bilaterally  CARDIOVASCULAR: Regular rate and rhythm, normal S1 and S2, no murmur/rub/gallop; No lower extremity edema; Peripheral pulses are 2+ bilaterally  ABDOMEN: Nontender to palpation, normoactive bowel sounds, no rebound/guarding; No hepatosplenomegaly  MUSCULOSKELETAL:  no clubbing or cyanosis of digits; no joint swelling or tenderness to palpation  PSYCH: A+O to person, place, and time; affect appropriate  NEUROLOGY: CN 2-12 are intact and symmetric; no gross sensory deficits   SKIN: No rashes; no palpable lesions    LABS:                        10.6   4.65  )-----------( 211      ( 18 Apr 2023 05:10 )             34.6     04-18    137  |  102  |  9   ----------------------------<  81  4.3   |  25  |  0.80    Ca    9.0      18 Apr 2023 05:10  Mg     2.00     04-18    TPro  6.1  /  Alb  3.6  /  TBili  0.3  /  DBili  x   /  AST  12  /  ALT  5   /  AlkPhos  53  04-18                RADIOLOGY & ADDITIONAL TESTS:  Results Reviewed:   Imaging Personally Reviewed:  Electrocardiogram Personally Reviewed:    COORDINATION OF CARE:  Care Discussed with Consultants/Other Providers [Y/N]:  Prior or Outpatient Records Reviewed [Y/N]:

## 2023-04-19 NOTE — PROVIDER CONTACT NOTE (OTHER) - ASSESSMENT
pt states that he was sleeping and was woken up from seizure activity. pt states that his feet and hands were trembling for about a minute or two and he was unable to control himself. There is a PCA and [employer] at the bedside at all times, when asked they stated that they did not witness seizure like activity. BP slightly elevated 130/90.  all other VS WDL. pt states he is "shaken up" from what he just went through

## 2023-04-19 NOTE — BH INPATIENT PSYCHIATRY ASSESSMENT NOTE - MEDICAL RECORD REVIEWED
You received 975 mg of acetaminophen (Tylenol) at 1:15 PM. Do not exceed 4,000 mg of acetaminophen during a 24 hour period and keep in mind that acetaminophen can also be found in many over-the-counter cold medications as well as narcotics that may be given for pain.     If you have any questions or concerns regarding your procedure, please contact Dr. Parson, his office number is [phone].     Yes

## 2023-04-19 NOTE — BH CONSULTATION LIAISON PROGRESS NOTE - NSBHFUPINTERVALHXFT_PSY_A_CORE
No agitation overnight. Has been in behavioral control. Chart reviewed. Patient was seen at beside, handcuffed to bed with . Patient states he is doing well. Reports overnight having shaking and although was awake, could not move his body. He was concern for possible seizure. Discussed with him unlikely given patient was awake and alert during episode. Patient then mentioned he wants to know why he is going to Forensic unit. Patient denies any depression prior to admission and no suicidal thoughts. States taking care of his grandfather who has dementia is best for him and inpatient unit environment would be worse for his health. When asking about recent suicide attempt with pain killer medications, patient denied and said last suicide attempt was several years ago when he was teenage. Denies any SI today.  No agitation overnight. Has been in behavioral control. Chart reviewed. Patient was seen at beside, handcuffed to bed with . Patient states he is doing well. Reports overnight having shaking and although was awake, could not move his body. He was concern for possible seizure. Patient then mentioned he wants to know why he is going to Forensic unit. Patient denies any depression prior to admission and no suicidal thoughts. States taking care of his grandfather who has dementia is best for him and inpatient unit environment would be worse for his health. When asking about recent suicide attempt with pain killer medications/Klonopin, patient stated that it was just an impulsive attempt, and said before this last suicide attempt was several years ago when he was teenage. Denies any SI today.  No agitation overnight. Has been in behavioral control. Chart reviewed. Patient was seen at beside, handcuffed to bed with . Patient states he is doing well. Reports overnight having shaking and although was awake, could not move his body. He was concern for possible seizure. Patient then mentioned he wants to know why he is going to Forensic unit. Patient denies any depression prior to admission and no suicidal thoughts. States taking care of his grandfather who has dementia is best for him and inpatient unit environment would be worse for his health. When asking about recent suicide attempt with pain killer medications/Klonopin, patient stated that it was just an impulsive attempt, and said before this last suicide attempt was several years ago when he was teenage. Denies any SI today.     Call mother, Aviva. Says patient has been depressed for about 2 years, He is concerned because parents will be moving next year and he does not know what is going to do alone. Feels abandoned. Patient also had friend's relative die by suicide which affected him greatly. Patient has been attempting to find help but on await list for therapy. Prior to admission to hospital, had suicide attempt day before with pills - took about 30. Mother made him throw up medications. She found out because patient's friend had contacted her because they were worried about goodbye post on [employer] and [employer]. Mother feels patient need more help and supports with plan for him to be psychiatrically admitted.

## 2023-04-19 NOTE — BH INPATIENT PSYCHIATRY ASSESSMENT NOTE - NSCOMMENTSUICRISKFACT_PSY_ALL_CORE
Chronic rf include male sex, age, h/o psychiatric hospitalizations, h/o suicide attempt x 8, h/o mood d/o, h/o cluster b PD, h/o NSSIB  Acute rf include poor insight, impulsivity, s/p recent suicide attempt, not in treatment, pending arrest Chronic rf include male sex, age, h/o psychiatric hospitalizations, h/o suicide attempt x 8, h/o mood d/o, h/o cluster b PD, h/o NSSIB  Acute rf include poor insight, impulsivity, s/p recent suicide attempt, not in treatment, arraigned

## 2023-04-19 NOTE — DISCHARGE NOTE PROVIDER - NSDCCPCAREPLAN_GEN_ALL_CORE_FT
PRINCIPAL DISCHARGE DIAGNOSIS  Diagnosis: Intentional drug overdose  Assessment and Plan of Treatment: Pt. will be transferred to [facility] inpatient      SECONDARY DISCHARGE DIAGNOSES  Diagnosis: Adult ADHD  Assessment and Plan of Treatment: Continue to hold aderall and restart as per psychiatry instructions at [facility]    Diagnosis: Bipolar disorder  Assessment and Plan of Treatment: COntinue current treatment and transfer to [facility] inpatient psychiatry

## 2023-04-19 NOTE — BH CONSULTATION LIAISON PROGRESS NOTE - NSBHATTESTBILLING_PSY_A_CORE
90674-Bubjcrrove OBS or IP - high complexity OR 50-79 mins
34839-Rwtzbuwyko OBS or IP - high complexity OR 50-79 mins
61935-Rxkkhfnvbj OBS or IP - high complexity OR 50-79 mins

## 2023-04-19 NOTE — BH INPATIENT PSYCHIATRY ASSESSMENT NOTE - NSBHMETABOLIC_PSY_ALL_CORE_FT
BMI: BMI (kg/m2): 22 (04-16-23 @ 17:24)  HbA1c:   Glucose: POCT Blood Glucose.: 147 mg/dL (04-14-23 @ 21:44)    BP: --  Lipid Panel:

## 2023-04-19 NOTE — PROGRESS NOTE ADULT - REASON FOR ADMISSION
Intentional overdose

## 2023-04-19 NOTE — BH INPATIENT PSYCHIATRY ASSESSMENT NOTE - NSICDXPASTMEDICALHX_GEN_ALL_CORE_FT
PAST MEDICAL HISTORY:  ADHD (attention deficit hyperactivity disorder)     Asthma     Bipolar disorder     Depression     Mood disorder

## 2023-04-19 NOTE — DISCHARGE NOTE NURSING/CASE MANAGEMENT/SOCIAL WORK - PATIENT PORTAL LINK FT
You can access the FollowMyHealth Patient Portal offered by [facility] by registering at the following website: [email or web address]. By joining [employer]’s FollowMyHealth portal, you will also be able to view your health information using other applications (apps) compatible with our system.
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
## 2023-04-19 NOTE — DISCHARGE NOTE PROVIDER - NSDCFUSCHEDAPPT_GEN_ALL_CORE_FT
Opal Physician [facility]  [facility]  Scheduled Appointment: 04/20/2023    David Roque  [facility]  [facility]  Scheduled Appointment: 04/20/2023     [facility]  Scheduled Appointment: 04/19/2023    [facility] Physician On license of [facility]  [facility]  Scheduled Appointment: 04/20/2023    David Roque  [facility] Physician On license of [facility]  [facility]  Scheduled Appointment: 04/20/2023     [facility]  [facility]  Scheduled Appointment: 04/20/2023    David Roque  [facility]  [facility]  Scheduled Appointment: 04/20/2023    [facility]  [facility]  Scheduled Appointment: 04/20/2023
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
## 2023-04-20 ENCOUNTER — APPOINTMENT (OUTPATIENT)
Dept: HEMATOLOGY ONCOLOGY | Facility: CLINIC | Age: 23
End: 2023-04-20

## 2023-04-20 DIAGNOSIS — D50.9 IRON DEFICIENCY ANEMIA, UNSPECIFIED: ICD-10-CM

## 2023-04-20 DIAGNOSIS — Z98.84 BARIATRIC SURGERY STATUS: ICD-10-CM

## 2023-04-20 DIAGNOSIS — T14.91XA SUICIDE ATTEMPT, INITIAL ENCOUNTER: ICD-10-CM

## 2023-04-20 PROCEDURE — 99222 1ST HOSP IP/OBS MODERATE 55: CPT

## 2023-04-20 PROCEDURE — 99233 SBSQ HOSP IP/OBS HIGH 50: CPT

## 2023-04-20 PROCEDURE — 90832 PSYTX W PT 30 MINUTES: CPT | Mod: 59

## 2023-04-20 PROCEDURE — 90853 GROUP PSYCHOTHERAPY: CPT

## 2023-04-20 RX ORDER — SENNA PLUS 8.6 MG/1
2 TABLET ORAL AT BEDTIME
Refills: 0 | Status: DISCONTINUED | OUTPATIENT
Start: 2023-04-20 | End: 2023-05-02

## 2023-04-20 RX ORDER — FERROUS SULFATE 325(65) MG
325 TABLET ORAL DAILY
Refills: 0 | Status: DISCONTINUED | OUTPATIENT
Start: 2023-04-20 | End: 2023-05-02

## 2023-04-20 RX ADMIN — Medication 6 MILLIGRAM(S): at 20:09

## 2023-04-20 RX ADMIN — Medication 1 PATCH: at 10:11

## 2023-04-20 RX ADMIN — Medication 4 MILLIGRAM(S): at 18:13

## 2023-04-20 RX ADMIN — Medication 4 MILLIGRAM(S): at 10:11

## 2023-04-20 RX ADMIN — SENNA PLUS 2 TABLET(S): 8.6 TABLET ORAL at 20:10

## 2023-04-20 RX ADMIN — PANTOPRAZOLE SODIUM 40 MILLIGRAM(S): 20 TABLET, DELAYED RELEASE ORAL at 10:38

## 2023-04-20 NOTE — PSYCHIATRIC REHAB INITIAL EVALUATION - NSBHALCSUBTREAT_PSY_ALL_CORE
Patient is not currently in outpatient treatment, however, expressed that he was on a waiting list to speak with a psychiatrist and therapist prior to this admission./None

## 2023-04-20 NOTE — PSYCHIATRIC REHAB INITIAL EVALUATION - NSBHPRTOOLBOX_PSY_ALL_CORE
Patient identified DBT skills, poetry, skating, and drumming as his areas of interest and coping/Art/Entertainment/Friend support/Mindfulness practice/Outdoor activity/Other

## 2023-04-20 NOTE — BH SOCIAL WORK INITIAL PSYCHOSOCIAL EVALUATION - NSBHEMPPOSITIONFT_PSY_ALL_CORE
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

## 2023-04-20 NOTE — CONSULT NOTE ADULT - ASSESSMENT
22 y/o M with history of gastric sleeve surgery, ADHD, bipolar disorder, depression that presented with AMS after intentional overdose. S/P intubation and ICU monitoring, now transferred to [facility] for further management.

## 2023-04-20 NOTE — PSYCHIATRIC REHAB INITIAL EVALUATION - NSBHALCSUBCHOICE_PSY_ALL_CORE
Per chart, patient ingested different benzos prior to admission. Patient reported that these were his mother's anxiety medications.

## 2023-04-20 NOTE — CONSULT NOTE ADULT - PROBLEM SELECTOR RECOMMENDATION 9
-patient with history of gastric sleeve  -currently without issue, tolerating diet  -having regular BMs  -c/w multivitamin supplement

## 2023-04-20 NOTE — CONSULT NOTE ADULT - PROBLEM SELECTOR RECOMMENDATION 2
-noted with hgb to 10-11 range  -Iron studies suggesting iron deficiency  -Most likely in setting of gastric sleeve surgery  -Can begin ferrous sulfate 325mg with ascorbic acid  -ensure regular bowel regimen as iron supplement can cause constipation

## 2023-04-20 NOTE — BH INPATIENT PSYCHIATRY PROGRESS NOTE - PRN MEDS
MEDICATIONS  (PRN):  aluminum hydroxide/magnesium hydroxide/simethicone Suspension 30 milliLiter(s) Oral every 4 hours PRN Dyspepsia  hydrOXYzine hydrochloride 25 milliGRAM(s) Oral every 8 hours PRN anxiety  LORazepam     Tablet 2 milliGRAM(s) Oral every 4 hours PRN agitation  LORazepam   Injectable 2 milliGRAM(s) IntraMuscular once PRN severe agitation  mupirocin 2% Ointment 1 Application(s) Topical two times a day PRN rash  nicotine  Polacrilex Gum 4 milliGRAM(s) Oral every 2 hours PRN breakthrough cravings  traZODone 50 milliGRAM(s) Oral at bedtime PRN insomnia

## 2023-04-20 NOTE — BH INPATIENT PSYCHIATRY PROGRESS NOTE - MSE UNSTRUCTURED FT
On exam today the patient is calm and cooperative. Patient has fair grooming and good hygiene. Visible tattoos and body modification.  Speech is clear and of normal rate, mildly pressured   Thought process: largely linear, occasionally circumstantial  Thought content: without leonidas delusions, ruminations, preoccupations. +minimizing, rationalizing themes, notably with focus on sister rather pt's own behavior  Perception: Denies auditory hallucinations or other perceptual disturbances  Mood: Describes as "nervous" Affect: neutral, stable, expansive  Patient denies active suicidal ideation, intention and plan.    Patient denies active aggressive/homicidal ideation, intent or plan; adequate behavioral control  Patient is alert and oriented .   Fund of knowledge is fair. Memory is intact.  Insight and judgment are poor.   Impulse control is intact at this time.  Reliability: poor

## 2023-04-20 NOTE — BH INPATIENT PSYCHIATRY PROGRESS NOTE - CURRENT MEDICATION
MEDICATIONS  (STANDING):  ARIPiprazole 10 milliGRAM(s) Oral at bedtime  ferrous    sulfate 325 milliGRAM(s) Oral daily  melatonin. 6 milliGRAM(s) Oral at bedtime  multivitamin 1 Tablet(s) Oral daily  nicotine - 21 mG/24Hr(s) Patch 1 Patch Transdermal daily  pantoprazole    Tablet 40 milliGRAM(s) Oral before breakfast  senna 2 Tablet(s) Oral at bedtime  testosterone cypionate Injectable 200 milliGRAM(s) IntraMuscular <User Schedule>    MEDICATIONS  (PRN):  aluminum hydroxide/magnesium hydroxide/simethicone Suspension 30 milliLiter(s) Oral every 4 hours PRN Dyspepsia  hydrOXYzine hydrochloride 25 milliGRAM(s) Oral every 8 hours PRN anxiety  LORazepam     Tablet 2 milliGRAM(s) Oral every 4 hours PRN agitation  LORazepam   Injectable 2 milliGRAM(s) IntraMuscular once PRN severe agitation  mupirocin 2% Ointment 1 Application(s) Topical two times a day PRN rash  nicotine  Polacrilex Gum 4 milliGRAM(s) Oral every 2 hours PRN breakthrough cravings  traZODone 50 milliGRAM(s) Oral at bedtime PRN insomnia
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
## 2023-04-20 NOTE — BH SOCIAL WORK INITIAL PSYCHOSOCIAL EVALUATION - NSBHLEGALCURRENTDETAILS_PSY_ALL_CORE
Next court date: Date:June 27, 2023 Time:09:00 AM Court:[place] [address]  Next court date: Date:June 27, 2023 Time:09:00 AM Court: [place] [address]

## 2023-04-20 NOTE — BH INPATIENT PSYCHIATRY PROGRESS NOTE - NSBHCHARTREVIEWVS_PSY_A_CORE FT
Vital Signs Last 24 Hrs  T(C): 35.7 (04-21-23 @ 06:32), Max: 36.2 (04-20-23 @ 18:12)  T(F): 96.2 (04-21-23 @ 06:32), Max: 97.2 (04-20-23 @ 18:12)  HR: 59 (04-21-23 @ 06:32) (59 - 59)  BP: 131/94 (04-21-23 @ 06:32) (131/94 - 131/94)  BP(mean): --  RR: --  SpO2: --    Orthostatic VS  04-20-23 @ 20:12  Lying BP: --/-- HR: --  Sitting BP: 124/65 HR: 97  Standing BP: 119/75 HR: 105  Site: --  Mode: --  Orthostatic VS  04-20-23 @ 07:27  Lying BP: --/-- HR: --  Sitting BP: 124/84 HR: 76  Standing BP: --/-- HR: --  Site: --  Mode: --  Orthostatic VS  04-19-23 @ 20:36  Lying BP: --/-- HR: --  Sitting BP: 122/88 HR: 85  Standing BP: 121/85 HR: 102  Site: upper left arm  Mode: --
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
## 2023-04-21 PROCEDURE — 90853 GROUP PSYCHOTHERAPY: CPT

## 2023-04-21 PROCEDURE — 99231 SBSQ HOSP IP/OBS SF/LOW 25: CPT

## 2023-04-21 RX ORDER — TRAZODONE HCL 50 MG
50 TABLET ORAL AT BEDTIME
Refills: 0 | Status: DISCONTINUED | OUTPATIENT
Start: 2023-04-21 | End: 2023-04-24

## 2023-04-21 RX ORDER — DIPHENHYDRAMINE HCL 50 MG
50 CAPSULE ORAL ONCE
Refills: 0 | Status: COMPLETED | OUTPATIENT
Start: 2023-04-21 | End: 2023-04-21

## 2023-04-21 RX ORDER — ARIPIPRAZOLE 15 MG/1
10 TABLET ORAL AT BEDTIME
Refills: 0 | Status: DISCONTINUED | OUTPATIENT
Start: 2023-04-21 | End: 2023-04-23

## 2023-04-21 RX ADMIN — Medication 6 MILLIGRAM(S): at 20:10

## 2023-04-21 RX ADMIN — Medication 1 PATCH: at 08:44

## 2023-04-21 RX ADMIN — SENNA PLUS 2 TABLET(S): 8.6 TABLET ORAL at 20:10

## 2023-04-21 RX ADMIN — Medication 50 MILLIGRAM(S): at 02:30

## 2023-04-21 RX ADMIN — Medication 4 MILLIGRAM(S): at 08:46

## 2023-04-21 RX ADMIN — Medication 200 MILLIGRAM(S): at 21:05

## 2023-04-21 RX ADMIN — Medication 325 MILLIGRAM(S): at 08:45

## 2023-04-21 RX ADMIN — Medication 4 MILLIGRAM(S): at 01:34

## 2023-04-21 RX ADMIN — ARIPIPRAZOLE 10 MILLIGRAM(S): 15 TABLET ORAL at 20:10

## 2023-04-21 RX ADMIN — PANTOPRAZOLE SODIUM 40 MILLIGRAM(S): 20 TABLET, DELAYED RELEASE ORAL at 08:45

## 2023-04-21 RX ADMIN — Medication 4 MILLIGRAM(S): at 12:33

## 2023-04-21 RX ADMIN — Medication 50 MILLIGRAM(S): at 20:12

## 2023-04-21 RX ADMIN — Medication 1 TABLET(S): at 08:46

## 2023-04-21 NOTE — BH INPATIENT PSYCHIATRY PROGRESS NOTE - MSE UNSTRUCTURED FT
Pt is a 24yo man with good grooming and hygiene, has many tattoos and body modification. He is calm and cooperative with appropriate eye contact. No psychomotor abnormalities noted. Stated mood is "good." Affect is euthymic, full. TP is linear. TC: denies SIIP, no evidence of delusions, +hopeful. No perceptual disturbances noted. Insight and judgement are limited. Impulse control is intact at this time.

## 2023-04-21 NOTE — BH INPATIENT PSYCHIATRY PROGRESS NOTE - CURRENT MEDICATION
MEDICATIONS  (STANDING):  ferrous    sulfate 325 milliGRAM(s) Oral daily  melatonin. 6 milliGRAM(s) Oral at bedtime  multivitamin 1 Tablet(s) Oral daily  nicotine - 21 mG/24Hr(s) Patch 1 Patch Transdermal daily  pantoprazole    Tablet 40 milliGRAM(s) Oral before breakfast  senna 2 Tablet(s) Oral at bedtime  testosterone cypionate Injectable 200 milliGRAM(s) IntraMuscular <User Schedule>    MEDICATIONS  (PRN):  aluminum hydroxide/magnesium hydroxide/simethicone Suspension 30 milliLiter(s) Oral every 4 hours PRN Dyspepsia  hydrOXYzine hydrochloride 25 milliGRAM(s) Oral every 8 hours PRN anxiety  LORazepam     Tablet 2 milliGRAM(s) Oral every 4 hours PRN agitation  LORazepam   Injectable 2 milliGRAM(s) IntraMuscular every 4 hours PRN Agitation  mupirocin 2% Ointment 1 Application(s) Topical two times a day PRN rash  nicotine  Polacrilex Gum 4 milliGRAM(s) Oral every 2 hours PRN breakthrough cravings

## 2023-04-21 NOTE — BH INPATIENT PSYCHIATRY PROGRESS NOTE - PRN MEDS
MEDICATIONS  (PRN):  aluminum hydroxide/magnesium hydroxide/simethicone Suspension 30 milliLiter(s) Oral every 4 hours PRN Dyspepsia  hydrOXYzine hydrochloride 25 milliGRAM(s) Oral every 8 hours PRN anxiety  LORazepam     Tablet 2 milliGRAM(s) Oral every 4 hours PRN agitation  LORazepam   Injectable 2 milliGRAM(s) IntraMuscular every 4 hours PRN Agitation  mupirocin 2% Ointment 1 Application(s) Topical two times a day PRN rash  nicotine  Polacrilex Gum 4 milliGRAM(s) Oral every 2 hours PRN breakthrough cravings

## 2023-04-21 NOTE — CHART NOTE - NSCHARTNOTEFT_GEN_A_CORE
[facility] Inpatient to ED Transfer Summary    Reason for Transfer/Medical Summary:  23M admitted to [facility] for Recurrent major depressive disorder, hx of asthma, bipolar, ADHD. The pt says he stood up to go to a bathroom felt dizzy and fell 3 x's, each time landing on his R hip. The pt is unsure of LOC or head strike. The first two falls were unwitnessed. The thrid was witnessed by ancillary staff. who did not notice LOC or had strike. Pt endorses R Hip pain, blurry vision, dizziness while sitting. Denies nausea vomit, fever chest pain, abdominal pain .     PAST MEDICAL & SURGICAL HISTORY:  Depression      ADHD (attention deficit hyperactivity disorder)      Bipolar disorder      Asthma      Mood disorder      S/P bariatric surgery          Allergies    dust (Unknown)  No Known Drug Allergies    Intolerances        MEDICATIONS  (STANDING):  ARIPiprazole 10 milliGRAM(s) Oral at bedtime  ferrous    sulfate 325 milliGRAM(s) Oral daily  melatonin. 6 milliGRAM(s) Oral at bedtime  multivitamin 1 Tablet(s) Oral daily  nicotine - 21 mG/24Hr(s) Patch 1 Patch Transdermal daily  pantoprazole    Tablet 40 milliGRAM(s) Oral before breakfast  senna 2 Tablet(s) Oral at bedtime    MEDICATIONS  (PRN):  aluminum hydroxide/magnesium hydroxide/simethicone Suspension 30 milliLiter(s) Oral every 4 hours PRN Dyspepsia  hydrOXYzine hydrochloride 25 milliGRAM(s) Oral every 8 hours PRN anxiety  LORazepam     Tablet 2 milliGRAM(s) Oral every 4 hours PRN agitation  LORazepam   Injectable 2 milliGRAM(s) IntraMuscular once PRN severe agitation  mupirocin 2% Ointment 1 Application(s) Topical two times a day PRN rash  nicotine  Polacrilex Gum 4 milliGRAM(s) Oral every 2 hours PRN breakthrough cravings  traZODone 50 milliGRAM(s) Oral at bedtime PRN insomnia      Vital Signs Last 24 Hrs  T(C): 35.8 (21 Apr 2023 19:10), Max: 35.8 (21 Apr 2023 19:10)  T(F): 96.5 (21 Apr 2023 19:10), Max: 96.5 (21 Apr 2023 19:10)  HR: 5b/ min)  BP:  50/ 30.   BP(mean): --  RR: -- 16b/ min.   SpO2: --      CAPILLARY BLOOD GLUCOSE            PHYSICAL EXAM:  GENERAL: lethargic.   HEAD:  Atraumatic, Normocephalic  EYES: EOMI, PERRLA, conjunctiva and sclera clear  NECK: Supple, No JVD  CHEST/LUNG: Clear to auscultation bilaterally; No wheeze  HEART: Bradycardia  ABDOMEN: Positive BS, Soft, Nontender.   EXTREMITIES:  2+ Peripheral Pulses, No clubbing, cyanosis, or edema  PSYCH: AAOx3  NEUROLOGY: non-focal  SKIN: large generalized tattoos.     LABS:      A/P   23M admitted to [facility] for Recurrent major depressive disorder, hx of asthma, bipolar, ADHD. Being transferred to Ed due to symptomatic bradycardia and hypotension, with 3 falls, 2 unwitnessed, third and final witnessed by ancillary staff. Pt endorses R Hip pain, blurry vision, dizziness while sitting. Vitals reveal Hypotension with BP = 50/ 30, and Bradycardiac with HR @ 53b/ min.  DYLAN, JOSE MANUELN. and ED LEVI Caldwell called and made aware of incoming pt.  - Symptomatic bradycardia and hypotension: resulting in 3 falls, with 2 unwitnessed, non ambulating dizziness, blurry vision, R hip pain, questionable LOC and head strike,         Psychiatry Section:  Psychiatric Summary/[facility] admitting diagnosis:    Psychiatric Recommendations:    Observation status (check one):   (X) Constant Observation  ( ) Enhanced care  ( ) Routine checks    Risk Status (check all that apply if present):  ( ) at risk for suicide/self-injury  ( ) at risk for aggressive behavior  (X) at risk for elopement  ( ) other risk: [facility] Inpatient to ED Transfer Summary    Reason for Transfer/Medical Summary:  23M admitted to [facility] for Recurrent major depressive disorder, hx of asthma, bipolar, ADHD. The pt says he stood up to go to a bathroom felt dizzy and fell 3 x's, each time landing on his R hip. The pt is unsure of LOC or head strike. The first two falls were unwitnessed. The thrid was witnessed by ancillary staff. who did not notice LOC or had strike. Pt endorses R Hip pain, blurry vision, dizziness while sitting. Denies nausea vomit, fever chest pain, abdominal pain .     PAST MEDICAL & SURGICAL HISTORY:  Depression      ADHD (attention deficit hyperactivity disorder)      Bipolar disorder      Asthma      Mood disorder      S/P bariatric surgery          Allergies    dust (Unknown)  No Known Drug Allergies    Intolerances        MEDICATIONS  (STANDING):  ARIPiprazole 10 milliGRAM(s) Oral at bedtime  ferrous    sulfate 325 milliGRAM(s) Oral daily  melatonin. 6 milliGRAM(s) Oral at bedtime  multivitamin 1 Tablet(s) Oral daily  nicotine - 21 mG/24Hr(s) Patch 1 Patch Transdermal daily  pantoprazole    Tablet 40 milliGRAM(s) Oral before breakfast  senna 2 Tablet(s) Oral at bedtime    MEDICATIONS  (PRN):  aluminum hydroxide/magnesium hydroxide/simethicone Suspension 30 milliLiter(s) Oral every 4 hours PRN Dyspepsia  hydrOXYzine hydrochloride 25 milliGRAM(s) Oral every 8 hours PRN anxiety  LORazepam     Tablet 2 milliGRAM(s) Oral every 4 hours PRN agitation  LORazepam   Injectable 2 milliGRAM(s) IntraMuscular once PRN severe agitation  mupirocin 2% Ointment 1 Application(s) Topical two times a day PRN rash  nicotine  Polacrilex Gum 4 milliGRAM(s) Oral every 2 hours PRN breakthrough cravings  traZODone 50 milliGRAM(s) Oral at bedtime PRN insomnia      Vital Signs Last 24 Hrs  T(C): 35.8 (21 Apr 2023 19:10), Max: 35.8 (21 Apr 2023 19:10)  T(F): 96.5 (21 Apr 2023 19:10), Max: 96.5 (21 Apr 2023 19:10)  HR: 5b/ min)  BP:  50/ 30.   BP(mean): --  RR: -- 16b/ min.   SpO2: --      CAPILLARY BLOOD GLUCOSE            PHYSICAL EXAM:  GENERAL: lethargic.   HEAD:  Atraumatic, Normocephalic  EYES: EOMI, PERRLA, conjunctiva and sclera clear  NECK: Supple, No JVD  CHEST/LUNG: Clear to auscultation bilaterally; No wheeze  HEART: Bradycardia  ABDOMEN: Positive BS, Soft, Nontender.   EXTREMITIES:  2+ Peripheral Pulses, No clubbing, cyanosis, or edema  PSYCH: AAOx3  NEUROLOGY: non-focal  SKIN: large generalized tattoos.     LABS:      A/P   23M admitted to [facility] for Recurrent major depressive disorder, hx of asthma, bipolar, ADHD. Being transferred to Ed due to symptomatic bradycardia and hypotension, with 3 falls, 2 unwitnessed, third and final witnessed by ancillary staff. Pt endorses R Hip pain, blurry vision, dizziness while sitting. Vitals reveal Hypotension with BP = 50/ 30, and Bradycardiac with HR @ 53b/ min.  DYLAN, A.DSarahN. and ED LEVI Caldwell called and made aware of incoming pt. emergency contact/ father Raffy Pascual @ [phone] called x 3 and number not in service.   - Symptomatic bradycardia and hypotension: resulting in 3 falls, with 2 unwitnessed, non ambulating dizziness, blurry vision, R hip pain, questionable LOC and head strike,         Psychiatry Section:  Psychiatric Summary/[facility] admitting diagnosis:    Psychiatric Recommendations:    Observation status (check one):   (X) Constant Observation  ( ) Enhanced care  ( ) Routine checks    Risk Status (check all that apply if present):  ( ) at risk for suicide/self-injury  ( ) at risk for aggressive behavior  (X) at risk for elopement  ( ) other risk:

## 2023-04-21 NOTE — BH INPATIENT PSYCHIATRY PROGRESS NOTE - NSBHFUPINTERVALHXFT_PSY_A_CORE
No acute events overnight. Pt with some difficulty sleeping. Today he reports stable mood, denies feeling depressed. Says that recent overdose was "unintentional" and lacking suicidal intent. He now denies SIIP or urges to self-harm. Hopeful about the future. Has good appetite and PO intake. Has been attending groups and engaged in unit milieu. Pt has been in good behavioral control. No evidence of otto or psychosis. He denies previous med trial with Abilify agreeable to start tonight.
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
## 2023-04-22 ENCOUNTER — EMERGENCY (EMERGENCY)
Facility: HOSPITAL | Age: 23
LOS: 1 days | Discharge: HOME HEALTH | End: 2023-04-22
Attending: EMERGENCY MEDICINE | Admitting: EMERGENCY MEDICINE
Payer: COMMERCIAL

## 2023-04-22 VITALS
OXYGEN SATURATION: 100 % | TEMPERATURE: 98 F | RESPIRATION RATE: 16 BRPM | SYSTOLIC BLOOD PRESSURE: 110 MMHG | DIASTOLIC BLOOD PRESSURE: 57 MMHG | HEART RATE: 55 BPM

## 2023-04-22 VITALS — HEIGHT: 72 IN

## 2023-04-22 DIAGNOSIS — Z98.84 BARIATRIC SURGERY STATUS: Chronic | ICD-10-CM

## 2023-04-22 LAB
ALBUMIN SERPL ELPH-MCNC: 4.3 G/DL — SIGNIFICANT CHANGE UP (ref 3.3–5)
ALP SERPL-CCNC: 57 U/L — SIGNIFICANT CHANGE UP (ref 40–120)
ALT FLD-CCNC: 13 U/L — SIGNIFICANT CHANGE UP (ref 4–41)
ANION GAP SERPL CALC-SCNC: 13 MMOL/L — SIGNIFICANT CHANGE UP (ref 7–14)
AST SERPL-CCNC: 23 U/L — SIGNIFICANT CHANGE UP (ref 4–40)
BASOPHILS # BLD AUTO: 0.04 K/UL — SIGNIFICANT CHANGE UP (ref 0–0.2)
BASOPHILS NFR BLD AUTO: 0.6 % — SIGNIFICANT CHANGE UP (ref 0–2)
BILIRUB SERPL-MCNC: <0.2 MG/DL — SIGNIFICANT CHANGE UP (ref 0.2–1.2)
BUN SERPL-MCNC: 16 MG/DL — SIGNIFICANT CHANGE UP (ref 7–23)
CALCIUM SERPL-MCNC: 9.1 MG/DL — SIGNIFICANT CHANGE UP (ref 8.4–10.5)
CHLORIDE SERPL-SCNC: 101 MMOL/L — SIGNIFICANT CHANGE UP (ref 98–107)
CO2 SERPL-SCNC: 25 MMOL/L — SIGNIFICANT CHANGE UP (ref 22–31)
CREAT SERPL-MCNC: 0.93 MG/DL — SIGNIFICANT CHANGE UP (ref 0.5–1.3)
EGFR: 118 ML/MIN/1.73M2 — SIGNIFICANT CHANGE UP
EOSINOPHIL # BLD AUTO: 0.12 K/UL — SIGNIFICANT CHANGE UP (ref 0–0.5)
EOSINOPHIL NFR BLD AUTO: 1.9 % — SIGNIFICANT CHANGE UP (ref 0–6)
GLUCOSE SERPL-MCNC: 95 MG/DL — SIGNIFICANT CHANGE UP (ref 70–99)
HCT VFR BLD CALC: 38.2 % — LOW (ref 39–50)
HGB BLD-MCNC: 12 G/DL — LOW (ref 13–17)
IANC: 2.9 K/UL — SIGNIFICANT CHANGE UP (ref 1.8–7.4)
IMM GRANULOCYTES NFR BLD AUTO: 0.2 % — SIGNIFICANT CHANGE UP (ref 0–0.9)
LYMPHOCYTES # BLD AUTO: 2.71 K/UL — SIGNIFICANT CHANGE UP (ref 1–3.3)
LYMPHOCYTES # BLD AUTO: 43.1 % — SIGNIFICANT CHANGE UP (ref 13–44)
MAGNESIUM SERPL-MCNC: 2.3 MG/DL — SIGNIFICANT CHANGE UP (ref 1.6–2.6)
MCHC RBC-ENTMCNC: 26.8 PG — LOW (ref 27–34)
MCHC RBC-ENTMCNC: 31.4 GM/DL — LOW (ref 32–36)
MCV RBC AUTO: 85.5 FL — SIGNIFICANT CHANGE UP (ref 80–100)
MONOCYTES # BLD AUTO: 0.51 K/UL — SIGNIFICANT CHANGE UP (ref 0–0.9)
MONOCYTES NFR BLD AUTO: 8.1 % — SIGNIFICANT CHANGE UP (ref 2–14)
NEUTROPHILS # BLD AUTO: 2.9 K/UL — SIGNIFICANT CHANGE UP (ref 1.8–7.4)
NEUTROPHILS NFR BLD AUTO: 46.1 % — SIGNIFICANT CHANGE UP (ref 43–77)
NRBC # BLD: 0 /100 WBCS — SIGNIFICANT CHANGE UP (ref 0–0)
NRBC # FLD: 0 K/UL — SIGNIFICANT CHANGE UP (ref 0–0)
PHOSPHATE SERPL-MCNC: 4 MG/DL — SIGNIFICANT CHANGE UP (ref 2.5–4.5)
PLATELET # BLD AUTO: 201 K/UL — SIGNIFICANT CHANGE UP (ref 150–400)
POTASSIUM SERPL-MCNC: 4.1 MMOL/L — SIGNIFICANT CHANGE UP (ref 3.5–5.3)
POTASSIUM SERPL-SCNC: 4.1 MMOL/L — SIGNIFICANT CHANGE UP (ref 3.5–5.3)
PROT SERPL-MCNC: 6.9 G/DL — SIGNIFICANT CHANGE UP (ref 6–8.3)
RBC # BLD: 4.47 M/UL — SIGNIFICANT CHANGE UP (ref 4.2–5.8)
RBC # FLD: 15.7 % — HIGH (ref 10.3–14.5)
SODIUM SERPL-SCNC: 139 MMOL/L — SIGNIFICANT CHANGE UP (ref 135–145)
WBC # BLD: 6.29 K/UL — SIGNIFICANT CHANGE UP (ref 3.8–10.5)
WBC # FLD AUTO: 6.29 K/UL — SIGNIFICANT CHANGE UP (ref 3.8–10.5)

## 2023-04-22 PROCEDURE — 99285 EMERGENCY DEPT VISIT HI MDM: CPT

## 2023-04-22 PROCEDURE — 99232 SBSQ HOSP IP/OBS MODERATE 35: CPT

## 2023-04-22 PROCEDURE — 93010 ELECTROCARDIOGRAM REPORT: CPT

## 2023-04-22 PROCEDURE — 71045 X-RAY EXAM CHEST 1 VIEW: CPT | Mod: 26

## 2023-04-22 PROCEDURE — 99233 SBSQ HOSP IP/OBS HIGH 50: CPT

## 2023-04-22 RX ORDER — ACETAMINOPHEN 500 MG
650 TABLET ORAL EVERY 6 HOURS
Refills: 0 | Status: DISCONTINUED | OUTPATIENT
Start: 2023-04-22 | End: 2023-05-02

## 2023-04-22 RX ORDER — SODIUM CHLORIDE 9 MG/ML
1000 INJECTION INTRAMUSCULAR; INTRAVENOUS; SUBCUTANEOUS ONCE
Refills: 0 | Status: COMPLETED | OUTPATIENT
Start: 2023-04-22 | End: 2023-04-22

## 2023-04-22 RX ADMIN — Medication 650 MILLIGRAM(S): at 22:10

## 2023-04-22 RX ADMIN — ARIPIPRAZOLE 10 MILLIGRAM(S): 15 TABLET ORAL at 20:56

## 2023-04-22 RX ADMIN — Medication 650 MILLIGRAM(S): at 09:19

## 2023-04-22 RX ADMIN — Medication 1 TABLET(S): at 08:45

## 2023-04-22 RX ADMIN — Medication 1 PATCH: at 09:04

## 2023-04-22 RX ADMIN — Medication 1 PATCH: at 09:05

## 2023-04-22 RX ADMIN — SENNA PLUS 2 TABLET(S): 8.6 TABLET ORAL at 20:56

## 2023-04-22 RX ADMIN — Medication 4 MILLIGRAM(S): at 20:57

## 2023-04-22 RX ADMIN — Medication 2 MILLIGRAM(S): at 10:14

## 2023-04-22 RX ADMIN — SODIUM CHLORIDE 2000 MILLILITER(S): 9 INJECTION INTRAMUSCULAR; INTRAVENOUS; SUBCUTANEOUS at 01:03

## 2023-04-22 RX ADMIN — Medication 650 MILLIGRAM(S): at 21:00

## 2023-04-22 RX ADMIN — Medication 6 MILLIGRAM(S): at 20:55

## 2023-04-22 RX ADMIN — Medication 650 MILLIGRAM(S): at 10:07

## 2023-04-22 RX ADMIN — Medication 325 MILLIGRAM(S): at 08:45

## 2023-04-22 RX ADMIN — PANTOPRAZOLE SODIUM 40 MILLIGRAM(S): 20 TABLET, DELAYED RELEASE ORAL at 08:15

## 2023-04-22 NOTE — BH INPATIENT PSYCHIATRY PROGRESS NOTE - PRN MEDS
MEDICATIONS  (PRN):  acetaminophen     Tablet .. 650 milliGRAM(s) Oral every 6 hours PRN Mild Pain (1 - 3), Moderate Pain (4 - 6)  aluminum hydroxide/magnesium hydroxide/simethicone Suspension 30 milliLiter(s) Oral every 4 hours PRN Dyspepsia  hydrOXYzine hydrochloride 25 milliGRAM(s) Oral every 8 hours PRN anxiety  LORazepam     Tablet 2 milliGRAM(s) Oral every 4 hours PRN agitation  LORazepam   Injectable 2 milliGRAM(s) IntraMuscular once PRN severe agitation  mupirocin 2% Ointment 1 Application(s) Topical two times a day PRN rash  nicotine  Polacrilex Gum 4 milliGRAM(s) Oral every 2 hours PRN breakthrough cravings  traZODone 50 milliGRAM(s) Oral at bedtime PRN insomnia

## 2023-04-22 NOTE — ED PROVIDER NOTE - NSFOLLOWUPCLINICS_GEN_ALL_ED_FT
[facility]  Cardiology  [address] [place] [address]  Phone: [phone]  Fax:   Follow Up Time: 1-3 Days

## 2023-04-22 NOTE — ED PROVIDER NOTE - NSFOLLOWUPINSTRUCTIONS_ED_ALL_ED_FT
No signs of emergency medical condition on today's workup.  Presumptive diagnosis made, but further evaluation may be required by your primary care doctor or specialist for a definitive diagnosis.  Therefore, follow up as directed and if symptoms change/worsen or any emergency conditions, please return to the ER.    YOU WERE SEEN FOR syncopal episodes    YOU HAD labs, ekg, chest xray done.   These were unremarkable.   Please do not take trazodone until you speak to the doctor who had prescribed you the medication. The syncopal episodes are most likely due to starting this medication.    FOLLOW UP WITH YOUR PRIMARY CARE PROVIDER    RETURN TO THE EMERGENCY DEPARTMENT FOR worsening symptoms or any new/concerning symptoms.

## 2023-04-22 NOTE — BH INPATIENT PSYCHIATRY PROGRESS NOTE - CURRENT MEDICATION
MEDICATIONS  (STANDING):  ARIPiprazole 10 milliGRAM(s) Oral at bedtime  ferrous    sulfate 325 milliGRAM(s) Oral daily  melatonin. 6 milliGRAM(s) Oral at bedtime  multivitamin 1 Tablet(s) Oral daily  nicotine - 21 mG/24Hr(s) Patch 1 Patch Transdermal daily  pantoprazole    Tablet 40 milliGRAM(s) Oral before breakfast  senna 2 Tablet(s) Oral at bedtime    MEDICATIONS  (PRN):  acetaminophen     Tablet .. 650 milliGRAM(s) Oral every 6 hours PRN Mild Pain (1 - 3), Moderate Pain (4 - 6)  aluminum hydroxide/magnesium hydroxide/simethicone Suspension 30 milliLiter(s) Oral every 4 hours PRN Dyspepsia  hydrOXYzine hydrochloride 25 milliGRAM(s) Oral every 8 hours PRN anxiety  LORazepam     Tablet 2 milliGRAM(s) Oral every 4 hours PRN agitation  LORazepam   Injectable 2 milliGRAM(s) IntraMuscular once PRN severe agitation  mupirocin 2% Ointment 1 Application(s) Topical two times a day PRN rash  nicotine  Polacrilex Gum 4 milliGRAM(s) Oral every 2 hours PRN breakthrough cravings  traZODone 50 milliGRAM(s) Oral at bedtime PRN insomnia

## 2023-04-22 NOTE — ED PROVIDER NOTE - PATIENT PORTAL LINK FT
You can access the FollowMyHealth Patient Portal offered by [facility] by registering at the following website: [email or web address]. By joining [facility]’s FollowMyHealth portal, you will also be able to view your health information using other applications (apps) compatible with our system.

## 2023-04-22 NOTE — PROVIDER CONTACT NOTE (OTHER) - ASSESSMENT
ANTONIO was notified by medical provider that pt is cleared for discharge and transport via ambulance is needed for pt to return to [facility]; pt has staff member with him. ANTONIO contacted [facility] Unit L6  spoke with Nurse, Roly, he confirmed that pt is on this unit and can return safely. He provided the receiving doctor information- Dr. Vernon Jarrett.  ANTONIO contacted [facility]  [phone] spoke with Vern to arrange transport via ambulance; ETA is 7AM.

## 2023-04-22 NOTE — ED ADULT TRIAGE NOTE - CHIEF COMPLAINT QUOTE
notification- dizziness and syncope    pt brought directly to room 24 as notification for dizziness and several syncopal episodes. ems vs- bp 82/49, hr 52, rr 16, o2sat on ra 100%. bgl 130.
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
## 2023-04-22 NOTE — ED PROVIDER NOTE - PROGRESS NOTE DETAILS
Milton Khan, PGY1 - patient reassessed. Informed of his result. Denies any current symptoms. Will set up with social work for discharge to [facility]. Milton Khan, PGY1 - tele reviewed. Sinus bradycardia without any other arrhythmias. Milton Khan, PGY1 - talked to placido intern on call. Will dc Milton Khan, PGY1 - talked to social work about setting up transport to [address].  scheduled at 7am.

## 2023-04-22 NOTE — BH INPATIENT PSYCHIATRY PROGRESS NOTE - NSBHFUPINTERVALHXFT_PSY_A_CORE
f/up mood d/o, VSS. Patient had an hypotensive episode last night prompting an ED visit, where he was hydrated and then subsequently return to the unit. Patient continues to report that the SA was not intentional, reported that his mother is supportive/ validating. Patient reported that he was protective over his mother and did not want his sister to hurt his mother. Patient denied any SE at this time. Denied SI/I/P. Patient discussed his work at [employer], which seems to please him. Reported chipping his tooth while at ED, declined dental consult.

## 2023-04-22 NOTE — ED PROVIDER NOTE - PHYSICAL EXAMINATION
Primary Survey:  A - airway intact  B - b/l breath sounds, symmetrical chest rise  C - equal radial pulses  D - GCS 15  E - Patient exposed    Secondary Survey:  Head: NCAT  EENT: no facial TTP, dentition intact, PERRL 3mm  Neck: no midline c-spine tenderness, t-spine tenderness, l-spine tenderness.   Chest: no chest wall TTP, RRR  Lungs: CTA b/l  Abd: soft, NTND  Pelvis: stable  MSK: No obvious deformity. + tenderness to palpation on R buttock without any overt hematoma.   Neuro: CN II-XII intact. 5/5 strength bilaterally upper and lower extremities. Normal sensation bilaterally upper and lower extremities. Normal gait.

## 2023-04-22 NOTE — ED PROVIDER NOTE - CLINICAL SUMMARY MEDICAL DECISION MAKING FREE TEXT BOX
This is a 23 year old male with pmh of gastric sleeve surgery, ADHD, bipolar d/o, depression who had a recent admission s/p intubation 2/2 intentional OD of oxycodone/tylenol and xanax presenting today after syncopal episodes that happened around 10pm at night. Zulma meyers. Will work up syncope. This is most likely given the starting of medication of trazodone. Will instruct to stay away from trazodone when discharged. Labs unremarkable. CXR clear. EKG normal. Will dc with return precautions.

## 2023-04-22 NOTE — ED ADULT NURSE REASSESSMENT NOTE - NS ED NURSE REASSESS COMMENT FT1
Break Coverage RN: Pt A&Ox4, respirations equal and unlabored. Pt resting in stretcher at this time, offers no complaints. IV patent. Sinus gabriella on cardiac monitor. MD aware. Pt denies any lightheadedness, dizziness, any other complaints. No acute distress noted. Safety maintained, bed in lowest position, side rails raised, call bell in reach. [facility] staff remains at bedside.

## 2023-04-22 NOTE — BH INPATIENT PSYCHIATRY PROGRESS NOTE - MSE UNSTRUCTURED FT
Pt is a 24yo man with good grooming and hygiene, has many tattoos and body modification. He is calm and cooperative with appropriate eye contact. No psychomotor abnormalities noted. Stated mood is "good." Affect is euthymic, full. TP is linear. TC: denies SIIP, no evidence of delusions, +hopeful. No perceptual disturbances noted. Insight and judgement are limited. Impulse control is intact at this time. alert and oriented x 4, stable gait.

## 2023-04-22 NOTE — BH INPATIENT PSYCHIATRY PROGRESS NOTE - NSBHCHARTREVIEWVS_PSY_A_CORE FT
Vital Signs Last 24 Hrs  T(C): 36.3 (04-22-23 @ 08:48), Max: 36.4 (04-22-23 @ 01:58)  T(F): 97.4 (04-22-23 @ 08:48), Max: 97.6 (04-22-23 @ 04:57)  HR: 89 (04-22-23 @ 08:48) (54 - 89)  BP: 141/85 (04-22-23 @ 08:48) (100/61 - 141/85)  BP(mean): --  RR: 16 (04-22-23 @ 04:57) (16 - 18)  SpO2: 100% (04-22-23 @ 04:57) (100% - 100%)    Orthostatic VS  04-21-23 @ 19:10  Lying BP: --/-- HR: --  Sitting BP: 128/83 HR: 72  Standing BP: 139/91 HR: 86  Site: --  Mode: --  Orthostatic VS  04-20-23 @ 20:12  Lying BP: --/-- HR: --  Sitting BP: 124/65 HR: 97  Standing BP: 119/75 HR: 105  Site: --  Mode: --

## 2023-04-22 NOTE — ED PROVIDER NOTE - OBJECTIVE STATEMENT
This is a 23 year old male with pmh of gastric sleeve surgery, ADHD, bipolar d/o, depression who had a recent admission s/p intubation 2/2 intentional OD of oxycodone/tylenol and xanax presenting today after syncopal episodes that happened around 10pm at night. Reports that he had played basketball about 3 hours prior and took new prescribed medications of Ability and trazodone as prescribed. Denies any intention of overdose. at 10pm while he was getting up from his bed he felt lightheaded and fell and hit his buttocks x4. Denies any head trauma. Denies any other symptoms currently, including fever/chills, chest pain, sob, abdominal pain, n/v/d. Denies having symptoms of lightheadedness currently.

## 2023-04-22 NOTE — PROGRESS NOTE ADULT - ASSESSMENT
24 y/o M with history of gastric sleeve surgery, ADHD, bipolar disorder, depression that presented with AMS after intentional overdose. S/P intubation and ICU monitoring, now transferred to [facility] for further management.    #Fall/bradycardia  -EKG reviewed. Sinus bradycardia w/ no acute ischemic changes. Similar to prior EKG  -currently HR wnl  -could check orthostatic vital  -educated patient to get up slowly  -appreciate primary team to optimize his psych med to avoid oversedation       #History of bariatric surgery.   -patient with history of gastric sleeve  -currently without issue, tolerating diet  -having regular BMs  -c/w multivitamin supplement.    # Iron deficiency anemia.   -Iron studies suggesting iron deficiency  -Most likely in setting of gastric sleeve surgery  -Can begin ferrous sulfate 325mg with ascorbic acid  -ensure regular bowel regimen as iron supplement can cause constipation.    # Suicide attempt.   - now s/p CIWA and COWS monitoring for benzo overdose  - management per primary  team.

## 2023-04-22 NOTE — ED PROVIDER NOTE - ATTENDING CONTRIBUTION TO CARE
Agree with resident note  23-year-old male with past medical history of gastric sleeve surgery, ADHD, bipolar, depression recent admission to [facility] after intentional overdose of oxycodone Tylenol and Xanax requiring intubation presents from [facility] for syncopal episode.  Patient states felt absolutely fine today played basketball for 2 to 3 hours prior to syncopal episode with no issues.  Denies any chest pain shortness of breath.  States took his medications tonight which include trazodone and Abilify and felt due to these medications felt unsteady.  States remembers falling but did fall to get up fall again.  Denies any head trauma.  States at present moment feels absolutely fine.  Denies any symptoms currently.  No prior history of syncope.  Denies any toxic ingestions of other medications.  Patient is inpatient [facility] so does not have access to any other meds.  Physical exam  Well-appearing male in no respiratory distress  Vital signs stable (EMS states initially patient was hypotensive, not hypotensive here)  Moist mucous membranes  Clear to auscultation bilaterally  S1-S2 no murmurs rubs or gallops  Abdomen soft nontender nondistended  Extremities no edema  Head normocephalic atraumatic  Pupils equal and reactive to light  Neuro cranial nerves intact, 5/5 motor upper and lower extremities, sensation intact  Impression  Syncope: We will get labs, EKG  Patient is not hypotensive here, shows no signs of head trauma  History possibly is of presyncope and not syncope  We will continue to observe and reassess after labs return  EKG does not show prolonged QT

## 2023-04-23 PROCEDURE — 99232 SBSQ HOSP IP/OBS MODERATE 35: CPT

## 2023-04-23 RX ORDER — ARIPIPRAZOLE 15 MG/1
15 TABLET ORAL AT BEDTIME
Refills: 0 | Status: DISCONTINUED | OUTPATIENT
Start: 2023-04-23 | End: 2023-04-28

## 2023-04-23 RX ADMIN — Medication 1 TABLET(S): at 08:32

## 2023-04-23 RX ADMIN — Medication 650 MILLIGRAM(S): at 21:03

## 2023-04-23 RX ADMIN — Medication 650 MILLIGRAM(S): at 20:00

## 2023-04-23 RX ADMIN — Medication 6 MILLIGRAM(S): at 20:01

## 2023-04-23 RX ADMIN — Medication 325 MILLIGRAM(S): at 08:32

## 2023-04-23 RX ADMIN — Medication 4 MILLIGRAM(S): at 20:01

## 2023-04-23 RX ADMIN — PANTOPRAZOLE SODIUM 40 MILLIGRAM(S): 20 TABLET, DELAYED RELEASE ORAL at 08:32

## 2023-04-23 RX ADMIN — ARIPIPRAZOLE 15 MILLIGRAM(S): 15 TABLET ORAL at 20:01

## 2023-04-23 RX ADMIN — Medication 1 PATCH: at 08:31

## 2023-04-23 RX ADMIN — SENNA PLUS 2 TABLET(S): 8.6 TABLET ORAL at 20:01

## 2023-04-23 NOTE — BH INPATIENT PSYCHIATRY PROGRESS NOTE - NSBHCHARTREVIEWVS_PSY_A_CORE FT
Vital Signs Last 24 Hrs  T(C): 36.2 (04-23-23 @ 07:43), Max: 36.2 (04-23-23 @ 07:43)  T(F): 97.1 (04-23-23 @ 07:43), Max: 97.1 (04-23-23 @ 07:43)  HR: 76 (04-22-23 @ 20:27) (76 - 76)  BP: 119/76 (04-23-23 @ 07:43) (119/76 - 136/83)  BP(mean): 67 (04-23-23 @ 07:43) (67 - 67)  RR: 16 (04-23-23 @ 07:43) (16 - 16)  SpO2: --    Orthostatic VS  04-21-23 @ 19:10  Lying BP: --/-- HR: --  Sitting BP: 128/83 HR: 72  Standing BP: 139/91 HR: 86  Site: --  Mode: --
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
## 2023-04-23 NOTE — BH INPATIENT PSYCHIATRY PROGRESS NOTE - CURRENT MEDICATION
MEDICATIONS  (STANDING):  ARIPiprazole 15 milliGRAM(s) Oral at bedtime  ferrous    sulfate 325 milliGRAM(s) Oral daily  melatonin. 6 milliGRAM(s) Oral at bedtime  multivitamin 1 Tablet(s) Oral daily  nicotine - 21 mG/24Hr(s) Patch 1 Patch Transdermal daily  pantoprazole    Tablet 40 milliGRAM(s) Oral before breakfast  senna 2 Tablet(s) Oral at bedtime    MEDICATIONS  (PRN):  acetaminophen     Tablet .. 650 milliGRAM(s) Oral every 6 hours PRN Mild Pain (1 - 3), Moderate Pain (4 - 6)  aluminum hydroxide/magnesium hydroxide/simethicone Suspension 30 milliLiter(s) Oral every 4 hours PRN Dyspepsia  hydrOXYzine hydrochloride 25 milliGRAM(s) Oral every 8 hours PRN anxiety  LORazepam     Tablet 2 milliGRAM(s) Oral every 4 hours PRN agitation  LORazepam   Injectable 2 milliGRAM(s) IntraMuscular once PRN severe agitation  mupirocin 2% Ointment 1 Application(s) Topical two times a day PRN rash  nicotine  Polacrilex Gum 4 milliGRAM(s) Oral every 2 hours PRN breakthrough cravings  traZODone 50 milliGRAM(s) Oral at bedtime PRN insomnia
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
## 2023-04-23 NOTE — BH INPATIENT PSYCHIATRY PROGRESS NOTE - MSE UNSTRUCTURED FT
Pt is a 22yo man with good grooming and hygiene, has many tattoos and body modification. He is calm and cooperative with appropriate eye contact. No psychomotor abnormalities noted. Stated mood is "good." Affect is euthymic, full. TP is linear. TC: denies SIIP, no evidence of delusions, +hopeful. No perceptual disturbances noted. Insight and judgement are limited. Impulse control is intact at this time. alert and oriented x 4, stable gait.

## 2023-04-24 PROCEDURE — 90853 GROUP PSYCHOTHERAPY: CPT

## 2023-04-24 PROCEDURE — 99232 SBSQ HOSP IP/OBS MODERATE 35: CPT

## 2023-04-24 RX ORDER — TRAZODONE HCL 50 MG
50 TABLET ORAL AT BEDTIME
Refills: 0 | Status: DISCONTINUED | OUTPATIENT
Start: 2023-04-24 | End: 2023-04-26

## 2023-04-24 RX ORDER — ARIPIPRAZOLE 15 MG/1
1 TABLET ORAL
Qty: 30 | Refills: 0
Start: 2023-04-24 | End: 2023-05-23

## 2023-04-24 RX ORDER — BENZTROPINE MESYLATE 1 MG
2 TABLET ORAL ONCE
Refills: 0 | Status: COMPLETED | OUTPATIENT
Start: 2023-04-24 | End: 2023-04-24

## 2023-04-24 RX ORDER — BENZTROPINE MESYLATE 1 MG
1 TABLET ORAL
Refills: 0 | Status: DISCONTINUED | OUTPATIENT
Start: 2023-04-24 | End: 2023-04-25

## 2023-04-24 RX ORDER — TRAZODONE HCL 50 MG
100 TABLET ORAL AT BEDTIME
Refills: 0 | Status: DISCONTINUED | OUTPATIENT
Start: 2023-04-24 | End: 2023-04-24

## 2023-04-24 RX ORDER — OMEPRAZOLE 10 MG/1
1 CAPSULE, DELAYED RELEASE ORAL
Qty: 0 | Refills: 0 | DISCHARGE

## 2023-04-24 RX ADMIN — Medication 650 MILLIGRAM(S): at 16:21

## 2023-04-24 RX ADMIN — SENNA PLUS 2 TABLET(S): 8.6 TABLET ORAL at 21:03

## 2023-04-24 RX ADMIN — Medication 1 PATCH: at 17:38

## 2023-04-24 RX ADMIN — Medication 650 MILLIGRAM(S): at 10:09

## 2023-04-24 RX ADMIN — Medication 1 TABLET(S): at 09:12

## 2023-04-24 RX ADMIN — Medication 6 MILLIGRAM(S): at 21:02

## 2023-04-24 RX ADMIN — Medication 4 MILLIGRAM(S): at 10:09

## 2023-04-24 RX ADMIN — Medication 1 MILLIGRAM(S): at 21:04

## 2023-04-24 RX ADMIN — Medication 2 MILLIGRAM(S): at 22:41

## 2023-04-24 RX ADMIN — Medication 325 MILLIGRAM(S): at 09:12

## 2023-04-24 RX ADMIN — PANTOPRAZOLE SODIUM 40 MILLIGRAM(S): 20 TABLET, DELAYED RELEASE ORAL at 09:12

## 2023-04-24 RX ADMIN — ARIPIPRAZOLE 15 MILLIGRAM(S): 15 TABLET ORAL at 21:03
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
## 2023-04-24 NOTE — BH INPATIENT PSYCHIATRY PROGRESS NOTE - MSE UNSTRUCTURED FT
Pt is a 22yo man with adequate grooming and hygiene, has many tattoos and body modifications/piercings of nose bridge, nose, ear hoop. Stable gait with no EPS evident. He is as times calm and cooperative with appropriate eye contact. No psychomotor abnormalities noted. Stated mood is "good." Affect is euthymic, but constricted. TP is linear. TC: denies SIIP, no evidence of delusions, +hopeful. No perceptual disturbances noted. Insight and judgment are limited and focus is more on sister and her behavior than himself. Impulse control is intact at this time. alert and oriented x 3, reliability is impaired.

## 2023-04-24 NOTE — DIETITIAN INITIAL EVALUATION ADULT - PERTINENT MEDS FT
MEDICATIONS  (STANDING):  ARIPiprazole 15 milliGRAM(s) Oral at bedtime  ferrous    sulfate 325 milliGRAM(s) Oral daily  melatonin. 6 milliGRAM(s) Oral at bedtime  multivitamin 1 Tablet(s) Oral daily  nicotine - 21 mG/24Hr(s) Patch 1 Patch Transdermal daily  pantoprazole    Tablet 40 milliGRAM(s) Oral before breakfast  senna 2 Tablet(s) Oral at bedtime
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
## 2023-04-24 NOTE — BH INPATIENT PSYCHIATRY PROGRESS NOTE - CURRENT MEDICATION
MEDICATIONS  (STANDING):  ARIPiprazole 15 milliGRAM(s) Oral at bedtime  benztropine 1 milliGRAM(s) Oral two times a day  ferrous    sulfate 325 milliGRAM(s) Oral daily  melatonin. 6 milliGRAM(s) Oral at bedtime  multivitamin 1 Tablet(s) Oral daily  nicotine - 21 mG/24Hr(s) Patch 1 Patch Transdermal daily  pantoprazole    Tablet 40 milliGRAM(s) Oral before breakfast  senna 2 Tablet(s) Oral at bedtime    MEDICATIONS  (PRN):  acetaminophen     Tablet .. 650 milliGRAM(s) Oral every 6 hours PRN Mild Pain (1 - 3), Moderate Pain (4 - 6)  aluminum hydroxide/magnesium hydroxide/simethicone Suspension 30 milliLiter(s) Oral every 4 hours PRN Dyspepsia  hydrOXYzine hydrochloride 25 milliGRAM(s) Oral every 8 hours PRN anxiety  LORazepam     Tablet 2 milliGRAM(s) Oral every 4 hours PRN agitation  LORazepam   Injectable 2 milliGRAM(s) IntraMuscular once PRN severe agitation  mupirocin 2% Ointment 1 Application(s) Topical two times a day PRN rash  nicotine  Polacrilex Gum 4 milliGRAM(s) Oral every 2 hours PRN breakthrough cravings  traZODone 50 milliGRAM(s) Oral at bedtime PRN insomnia

## 2023-04-24 NOTE — BH INPATIENT PSYCHIATRY PROGRESS NOTE - NSBHCHARTREVIEWVS_PSY_A_CORE FT
Vital Signs Last 24 Hrs  T(C): 36.2 (04-24-23 @ 18:12), Max: 36.6 (04-24-23 @ 07:41)  T(F): 97.2 (04-24-23 @ 18:12), Max: 97.8 (04-24-23 @ 07:41)  HR: --  BP: --  BP(mean): --  RR: --  SpO2: --    Orthostatic VS  04-24-23 @ 19:31  Lying BP: --/-- HR: --  Sitting BP: 122/80 HR: 91  Standing BP: 119/81 HR: 101  Site: --  Mode: --  Orthostatic VS  04-24-23 @ 07:41  Lying BP: --/-- HR: --  Sitting BP: 121/88 HR: 76  Standing BP: --/-- HR: --  Site: --  Mode: --

## 2023-04-24 NOTE — BH CHART NOTE - NSEVENTNOTEFT_PSY_ALL_CORE
DYLAN called by RN staff to assess patient for acute dystonia sx. Of note, patient had been experiencing involuntary contractions of tongue for the past 1.5 hours and had already received Cogentin 1mg (as ordered) with minimal effect. On examination, patient now experiencing involuntary b/l dorsiflexion of wrists (R>L), which the patient is able to minimally resist. Patient endorsed that Abilify at 15mg is the highest dose he has been on and has apparently been experiencing involuntary muscle contraction since Friday. DYLAN provided brief psychoeducation regarding Cogentin and offered another dose at 2mg via IM this time, which the patient was amenable to. Discussed with RN staff.  DYLAN called by RN staff to assess patient for acute dystonia sx. Of note, patient had been experiencing involuntary contractions of tongue for the past 1.5 hours and had already received Cogentin 1mg (as ordered) with minimal effect. On examination, patient now experiencing involuntary b/l dorsiflexion of wrists (R>L), which the patient is able to minimally resist. Patient endorsed that Abilify at 15mg is the highest dose he has been on and has apparently been experiencing involuntary muscle contraction since Friday. DYLAN provided brief psychoeducation regarding Cogentin and offered another dose at 2mg via IM this time, which the patient was amenable to. Discussed with RN staff. Update as of 11:20 PM: patient's symptoms has since resolved.
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
## 2023-04-25 PROCEDURE — 90832 PSYTX W PT 30 MINUTES: CPT

## 2023-04-25 PROCEDURE — 99232 SBSQ HOSP IP/OBS MODERATE 35: CPT

## 2023-04-25 RX ORDER — BENZTROPINE MESYLATE 1 MG
1.5 TABLET ORAL
Refills: 0 | Status: DISCONTINUED | OUTPATIENT
Start: 2023-04-25 | End: 2023-04-28

## 2023-04-25 RX ORDER — BENZTROPINE MESYLATE 1 MG
2 TABLET ORAL ONCE
Refills: 0 | Status: DISCONTINUED | OUTPATIENT
Start: 2023-04-25 | End: 2023-05-02

## 2023-04-25 RX ADMIN — Medication 4 MILLIGRAM(S): at 09:22

## 2023-04-25 RX ADMIN — Medication 1.5 MILLIGRAM(S): at 20:30

## 2023-04-25 RX ADMIN — Medication 1 MILLIGRAM(S): at 09:19

## 2023-04-25 RX ADMIN — Medication 325 MILLIGRAM(S): at 09:19

## 2023-04-25 RX ADMIN — SENNA PLUS 2 TABLET(S): 8.6 TABLET ORAL at 20:30

## 2023-04-25 RX ADMIN — Medication 1 PATCH: at 09:20

## 2023-04-25 RX ADMIN — ARIPIPRAZOLE 15 MILLIGRAM(S): 15 TABLET ORAL at 20:32

## 2023-04-25 RX ADMIN — Medication 6 MILLIGRAM(S): at 20:31

## 2023-04-25 RX ADMIN — PANTOPRAZOLE SODIUM 40 MILLIGRAM(S): 20 TABLET, DELAYED RELEASE ORAL at 09:18

## 2023-04-25 RX ADMIN — Medication 1 TABLET(S): at 09:18

## 2023-04-25 NOTE — BH INPATIENT PSYCHIATRY PROGRESS NOTE - NSBHCHARTREVIEWVS_PSY_A_CORE FT
Vital Signs Last 24 Hrs  T(C): 36.7 (04-25-23 @ 18:14), Max: 36.7 (04-25-23 @ 08:45)  T(F): 98 (04-25-23 @ 18:14), Max: 98.1 (04-25-23 @ 08:45)  HR: --  BP: 112/71 (04-25-23 @ 08:45) (112/71 - 112/71)  BP(mean): 69 (04-25-23 @ 08:45) (69 - 69)  RR: --  SpO2: --    Orthostatic VS  04-25-23 @ 20:28  Lying BP: --/-- HR: --  Sitting BP: 113/75 HR: 70  Standing BP: 98/66 HR: 88  Site: --  Mode: --  Orthostatic VS  04-24-23 @ 19:31  Lying BP: --/-- HR: --  Sitting BP: 122/80 HR: 91  Standing BP: 119/81 HR: 101  Site: --  Mode: --  Orthostatic VS  04-24-23 @ 07:41  Lying BP: --/-- HR: --  Sitting BP: 121/88 HR: 76  Standing BP: --/-- HR: --  Site: --  Mode: --

## 2023-04-25 NOTE — BH INPATIENT PSYCHIATRY PROGRESS NOTE - MSE UNSTRUCTURED FT
Pt is a 22yo man with adequate grooming and hygiene, has many tattoos and body modifications/piercings of nose bridge, nose, ear hoop. Stable gait with no EPS evident. He is as times calm and cooperative with appropriate eye contact. No psychomotor abnormalities noted. Stated mood is "good." Affect is euthymic, but constricted with more range emerging, occasional smile. TP is linear. TC: denies SIIP, no evidence of delusions, +hopeful. No perceptual disturbances noted. Insight and judgment are limited and focus is more on sister and her behavior than himself. Impulse control is intact at this time. alert and oriented x 3, reliability is impaired.
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
## 2023-04-25 NOTE — BH INPATIENT PSYCHIATRY PROGRESS NOTE - CURRENT MEDICATION
MEDICATIONS  (STANDING):  ARIPiprazole 15 milliGRAM(s) Oral at bedtime  benztropine 1.5 milliGRAM(s) Oral two times a day  ferrous    sulfate 325 milliGRAM(s) Oral daily  melatonin. 6 milliGRAM(s) Oral at bedtime  multivitamin 1 Tablet(s) Oral daily  nicotine - 21 mG/24Hr(s) Patch 1 Patch Transdermal daily  pantoprazole    Tablet 40 milliGRAM(s) Oral before breakfast  senna 2 Tablet(s) Oral at bedtime    MEDICATIONS  (PRN):  acetaminophen     Tablet .. 650 milliGRAM(s) Oral every 6 hours PRN Mild Pain (1 - 3), Moderate Pain (4 - 6)  aluminum hydroxide/magnesium hydroxide/simethicone Suspension 30 milliLiter(s) Oral every 4 hours PRN Dyspepsia  benztropine Injectable 2 milliGRAM(s) IntraMuscular once PRN EPS ppx  hydrOXYzine hydrochloride 25 milliGRAM(s) Oral every 8 hours PRN anxiety  LORazepam     Tablet 2 milliGRAM(s) Oral every 4 hours PRN agitation  LORazepam   Injectable 2 milliGRAM(s) IntraMuscular once PRN severe agitation  mupirocin 2% Ointment 1 Application(s) Topical two times a day PRN rash  nicotine  Polacrilex Gum 4 milliGRAM(s) Oral every 2 hours PRN breakthrough cravings  traZODone 50 milliGRAM(s) Oral at bedtime PRN insomnia
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
## 2023-04-25 NOTE — BH INPATIENT PSYCHIATRY PROGRESS NOTE - PRN MEDS
MEDICATIONS  (PRN):  acetaminophen     Tablet .. 650 milliGRAM(s) Oral every 6 hours PRN Mild Pain (1 - 3), Moderate Pain (4 - 6)  aluminum hydroxide/magnesium hydroxide/simethicone Suspension 30 milliLiter(s) Oral every 4 hours PRN Dyspepsia  benztropine Injectable 2 milliGRAM(s) IntraMuscular once PRN EPS ppx  hydrOXYzine hydrochloride 25 milliGRAM(s) Oral every 8 hours PRN anxiety  LORazepam     Tablet 2 milliGRAM(s) Oral every 4 hours PRN agitation  LORazepam   Injectable 2 milliGRAM(s) IntraMuscular once PRN severe agitation  mupirocin 2% Ointment 1 Application(s) Topical two times a day PRN rash  nicotine  Polacrilex Gum 4 milliGRAM(s) Oral every 2 hours PRN breakthrough cravings  traZODone 50 milliGRAM(s) Oral at bedtime PRN insomnia

## 2023-04-26 PROCEDURE — 99232 SBSQ HOSP IP/OBS MODERATE 35: CPT

## 2023-04-26 PROCEDURE — 90853 GROUP PSYCHOTHERAPY: CPT

## 2023-04-26 RX ADMIN — ARIPIPRAZOLE 15 MILLIGRAM(S): 15 TABLET ORAL at 20:12

## 2023-04-26 RX ADMIN — Medication 6 MILLIGRAM(S): at 20:13

## 2023-04-26 RX ADMIN — Medication 650 MILLIGRAM(S): at 18:52

## 2023-04-26 RX ADMIN — Medication 325 MILLIGRAM(S): at 08:50

## 2023-04-26 RX ADMIN — Medication 1.5 MILLIGRAM(S): at 20:12

## 2023-04-26 RX ADMIN — Medication 4 MILLIGRAM(S): at 18:52

## 2023-04-26 RX ADMIN — Medication 1 TABLET(S): at 08:50

## 2023-04-26 RX ADMIN — SENNA PLUS 2 TABLET(S): 8.6 TABLET ORAL at 20:12

## 2023-04-26 RX ADMIN — Medication 1 PATCH: at 08:50

## 2023-04-26 RX ADMIN — Medication 1.5 MILLIGRAM(S): at 08:50

## 2023-04-26 NOTE — PROGRESS NOTE ADULT - SUBJECTIVE AND OBJECTIVE BOX
PROGRESS NOTE:     Patient is a 23y old  Male who presents with a chief complaint of Suicide Attempt (20 Apr 2023 09:57)      SUBJECTIVE / OVERNIGHT EVENTS: patient transferred to ED overnight after a fall and came back from ED after evaluation in ED. Patient and examined at bedside. Reports that he was grinding his teeth and now his teeth hurt. Reports muscle pain on the R shoulder. Reports that he was dizzy after he got all his night meds yesterday and fell on his buttock multiple time. Currently ambulating without problem.     ADDITIONAL REVIEW OF SYSTEMS:    MEDICATIONS  (STANDING):  ARIPiprazole 10 milliGRAM(s) Oral at bedtime  ferrous    sulfate 325 milliGRAM(s) Oral daily  melatonin. 6 milliGRAM(s) Oral at bedtime  multivitamin 1 Tablet(s) Oral daily  nicotine - 21 mG/24Hr(s) Patch 1 Patch Transdermal daily  pantoprazole    Tablet 40 milliGRAM(s) Oral before breakfast  senna 2 Tablet(s) Oral at bedtime    MEDICATIONS  (PRN):  acetaminophen     Tablet .. 650 milliGRAM(s) Oral every 6 hours PRN Mild Pain (1 - 3), Moderate Pain (4 - 6)  aluminum hydroxide/magnesium hydroxide/simethicone Suspension 30 milliLiter(s) Oral every 4 hours PRN Dyspepsia  hydrOXYzine hydrochloride 25 milliGRAM(s) Oral every 8 hours PRN anxiety  LORazepam     Tablet 2 milliGRAM(s) Oral every 4 hours PRN agitation  LORazepam   Injectable 2 milliGRAM(s) IntraMuscular once PRN severe agitation  mupirocin 2% Ointment 1 Application(s) Topical two times a day PRN rash  nicotine  Polacrilex Gum 4 milliGRAM(s) Oral every 2 hours PRN breakthrough cravings  traZODone 50 milliGRAM(s) Oral at bedtime PRN insomnia      CAPILLARY BLOOD GLUCOSE      POCT Blood Glucose.: 86 mg/dL (22 Apr 2023 00:44)    I&O's Summary      PHYSICAL EXAM:  Vital Signs Last 24 Hrs  T(C): 36.3 (22 Apr 2023 08:48), Max: 36.4 (22 Apr 2023 01:58)  T(F): 97.4 (22 Apr 2023 08:48), Max: 97.6 (22 Apr 2023 04:57)  HR: 89 (22 Apr 2023 08:48) (54 - 89)  BP: 141/85 (22 Apr 2023 08:48) (100/61 - 141/85)  BP(mean): --  RR: 16 (22 Apr 2023 04:57) (16 - 18)  SpO2: 100% (22 Apr 2023 04:57) (100% - 100%)        CONSTITUTIONAL: NAD, sitting up in bed comfortably  RESPIRATORY: Normal respiratory effort; lungs are clear to auscultation bilaterally  CARDIOVASCULAR: Regular rate and rhythm, normal S1 and S2, no murmur/rub/gallop; No lower extremity edema; Peripheral pulses are 2+ bilaterally  ABDOMEN: Nontender to palpation, normoactive bowel sounds, no rebound/guarding; No hepatosplenomegaly  MUSCLOSKELETAL: no clubbing or cyanosis of digits; R trapezius muscle mildly tender to palpation. No bruising noted.   SKIN: multiple tattoos   PSYCH: A+O to person, place, and time; affect appropriate    LABS:                        12.0   6.29  )-----------( 201      ( 22 Apr 2023 00:55 )             38.2     04-22    139  |  101  |  16  ----------------------------<  95  4.1   |  25  |  0.93    Ca    9.1      22 Apr 2023 00:55  Phos  4.0     04-22  Mg     2.30     04-22    TPro  6.9  /  Alb  4.3  /  TBili  <0.2  /  DBili  x   /  AST  23  /  ALT  13  /  AlkPhos  57  04-22                RADIOLOGY & ADDITIONAL TESTS:  Results Reviewed:   Imaging Personally Reviewed:  Electrocardiogram Personally Reviewed:    COORDINATION OF CARE:  Care Discussed with Consultants/Other Providers [Y/N]:  Prior or Outpatient Records Reviewed [Y/N]:  
Patient is a 23y old male who presents with a chief complaint "chipped tooth"/ Patient is an inpatient of [facility] with Recurrent major depressive disorder.     HPI: Patient states that he went to the [facility] ED for extremely low blood pressure and was shivering and chipped his tooth. Patient denies pain.       PAST MEDICAL & SURGICAL HISTORY:  Depression  ADHD (attention deficit hyperactivity disorder)  Bipolar disorder  Asthma  Mood disorder  S/P bariatric surgery    MEDICATIONS  (STANDING):  ARIPiprazole 15 milliGRAM(s) Oral at bedtime  benztropine 1.5 milliGRAM(s) Oral two times a day  ferrous    sulfate 325 milliGRAM(s) Oral daily  melatonin. 6 milliGRAM(s) Oral at bedtime  multivitamin 1 Tablet(s) Oral daily  nicotine - 21 mG/24Hr(s) Patch 1 Patch Transdermal daily  pantoprazole    Tablet 40 milliGRAM(s) Oral before breakfast  senna 2 Tablet(s) Oral at bedtime    MEDICATIONS  (PRN):  acetaminophen     Tablet .. 650 milliGRAM(s) Oral every 6 hours PRN Mild Pain (1 - 3), Moderate Pain (4 - 6)  aluminum hydroxide/magnesium hydroxide/simethicone Suspension 30 milliLiter(s) Oral every 4 hours PRN Dyspepsia  benztropine Injectable 2 milliGRAM(s) IntraMuscular once PRN EPS ppx  hydrOXYzine hydrochloride 25 milliGRAM(s) Oral every 8 hours PRN anxiety  LORazepam     Tablet 2 milliGRAM(s) Oral every 4 hours PRN agitation  LORazepam   Injectable 2 milliGRAM(s) IntraMuscular once PRN severe agitation  mupirocin 2% Ointment 1 Application(s) Topical two times a day PRN rash  nicotine  Polacrilex Gum 4 milliGRAM(s) Oral every 2 hours PRN breakthrough cravings  traZODone 50 milliGRAM(s) Oral at bedtime PRN insomnia      Allergies    dust (Unknown)  No Known Drug Allergies     EOE: WNL, no swelling, no asymmetry, and no other suspicious pathology noted   IOE: WNL, no active infection, vestibular swelling, or abscess noted     Limited oral evaluation completed   - Gross caries in the cervical 1/3 #8  - patient denies any spontaneous pain, no pain on cold or hot   - no pain on percussion, palpation or biting #7-10    Radiograph: PA taken and interpreted   - Gross caries lesion noted on tooth #8 approximating the pulp     Assessment  - Gross caries lesion of tooth #8 approximating the pulp  - Enamel chipped in the cervical 1/3    Recommendation:  - Advised patient that #8 possibly may need RCT but he should visit his outside general dentist for comprehensive dental treatment. Advised patient that since he is not in any pain, no treatment is recommended for todays visit. Patient would like to save #8. Patient states that he has an outside dentist and endodontist that he goes to. Explained to patient that he should go as soon as possible to his dentist. Patient understands.     Donnie Marmolejo DDS #[id number]  Supervised by Dr. Aaron Walker DDS, MPH [id number]

## 2023-04-26 NOTE — BH INPATIENT PSYCHIATRY PROGRESS NOTE - CURRENT MEDICATION
MEDICATIONS  (STANDING):  ARIPiprazole 15 milliGRAM(s) Oral at bedtime  benztropine 1.5 milliGRAM(s) Oral two times a day  ferrous    sulfate 325 milliGRAM(s) Oral daily  melatonin. 6 milliGRAM(s) Oral at bedtime  multivitamin 1 Tablet(s) Oral daily  nicotine - 21 mG/24Hr(s) Patch 1 Patch Transdermal daily  pantoprazole    Tablet 40 milliGRAM(s) Oral before breakfast  senna 2 Tablet(s) Oral at bedtime    MEDICATIONS  (PRN):  acetaminophen     Tablet .. 650 milliGRAM(s) Oral every 6 hours PRN Mild Pain (1 - 3), Moderate Pain (4 - 6)  aluminum hydroxide/magnesium hydroxide/simethicone Suspension 30 milliLiter(s) Oral every 4 hours PRN Dyspepsia  benztropine Injectable 2 milliGRAM(s) IntraMuscular once PRN EPS ppx  hydrOXYzine hydrochloride 25 milliGRAM(s) Oral every 8 hours PRN anxiety  LORazepam     Tablet 2 milliGRAM(s) Oral every 4 hours PRN agitation  LORazepam   Injectable 2 milliGRAM(s) IntraMuscular once PRN severe agitation  mupirocin 2% Ointment 1 Application(s) Topical two times a day PRN rash  nicotine  Polacrilex Gum 4 milliGRAM(s) Oral every 2 hours PRN breakthrough cravings

## 2023-04-26 NOTE — BH INPATIENT PSYCHIATRY PROGRESS NOTE - MSE UNSTRUCTURED FT
Pt is a 22yo man with adequate grooming and hygiene, has many tattoos and body modifications/piercings of nose bridge, nose, ear hoop. Stable gait with no EPS evident. He is calm and cooperative with appropriate eye contact. No psychomotor abnormalities noted. Stated mood is "good." Affect is euthymic, but constricted with more range emerging, occasional smile, appropriate. TP is linear. TC: denies SIIP, no evidence of delusions, +hopeful. No perceptual disturbances noted. Insight and judgment are improving and focus is more on sister and her behavior than himself. Impulse control is intact at this time. alert and oriented x 3, reliability is impaired but improving.
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
## 2023-04-26 NOTE — BH INPATIENT PSYCHIATRY PROGRESS NOTE - NSBHCHARTREVIEWVS_PSY_A_CORE FT
Vital Signs Last 24 Hrs  T(C): 36.4 (04-26-23 @ 18:28), Max: 36.8 (04-26-23 @ 07:18)  T(F): 97.5 (04-26-23 @ 18:28), Max: 98.2 (04-26-23 @ 07:18)  HR: --  BP: --  BP(mean): --  RR: --  SpO2: --    Orthostatic VS  04-26-23 @ 07:18  Lying BP: --/-- HR: --  Sitting BP: 122/76 HR: 82  Standing BP: 130/79 HR: 79  Site: --  Mode: --  Orthostatic VS  04-25-23 @ 20:28  Lying BP: --/-- HR: --  Sitting BP: 113/75 HR: 70  Standing BP: 98/66 HR: 88  Site: --  Mode: --
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
## 2023-04-26 NOTE — BH INPATIENT PSYCHIATRY PROGRESS NOTE - PRN MEDS
MEDICATIONS  (PRN):  acetaminophen     Tablet .. 650 milliGRAM(s) Oral every 6 hours PRN Mild Pain (1 - 3), Moderate Pain (4 - 6)  aluminum hydroxide/magnesium hydroxide/simethicone Suspension 30 milliLiter(s) Oral every 4 hours PRN Dyspepsia  benztropine Injectable 2 milliGRAM(s) IntraMuscular once PRN EPS ppx  hydrOXYzine hydrochloride 25 milliGRAM(s) Oral every 8 hours PRN anxiety  LORazepam     Tablet 2 milliGRAM(s) Oral every 4 hours PRN agitation  LORazepam   Injectable 2 milliGRAM(s) IntraMuscular once PRN severe agitation  mupirocin 2% Ointment 1 Application(s) Topical two times a day PRN rash  nicotine  Polacrilex Gum 4 milliGRAM(s) Oral every 2 hours PRN breakthrough cravings

## 2023-04-27 PROCEDURE — 90853 GROUP PSYCHOTHERAPY: CPT

## 2023-04-27 PROCEDURE — 90832 PSYTX W PT 30 MINUTES: CPT | Mod: 59

## 2023-04-27 RX ADMIN — Medication 650 MILLIGRAM(S): at 14:32

## 2023-04-27 RX ADMIN — PANTOPRAZOLE SODIUM 40 MILLIGRAM(S): 20 TABLET, DELAYED RELEASE ORAL at 08:18

## 2023-04-27 RX ADMIN — Medication 1.5 MILLIGRAM(S): at 20:21

## 2023-04-27 RX ADMIN — Medication 1 TABLET(S): at 08:17

## 2023-04-27 RX ADMIN — Medication 4 MILLIGRAM(S): at 14:35

## 2023-04-27 RX ADMIN — Medication 325 MILLIGRAM(S): at 08:17

## 2023-04-27 RX ADMIN — Medication 6 MILLIGRAM(S): at 20:22

## 2023-04-27 RX ADMIN — Medication 1.5 MILLIGRAM(S): at 08:17

## 2023-04-27 RX ADMIN — Medication 1 PATCH: at 08:17

## 2023-04-27 RX ADMIN — SENNA PLUS 2 TABLET(S): 8.6 TABLET ORAL at 20:21

## 2023-04-27 RX ADMIN — ARIPIPRAZOLE 15 MILLIGRAM(S): 15 TABLET ORAL at 20:21

## 2023-04-27 NOTE — BH PSYCHOLOGY - CLINICIAN PSYCHOTHERAPY NOTE - TOKEN PULL-DIAGNOSIS
Primary Diagnosis:  Unspecified mood [affective] disorder [F39]        Problem Dx:   Suicide attempt [T14.91XA]      Iron deficiency anemia [D50.9]      History of bariatric surgery [Z98.84]      
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
## 2023-04-27 NOTE — BH INPATIENT PSYCHIATRY PROGRESS NOTE - CURRENT MEDICATION
MEDICATIONS  (STANDING):  benztropine 2 milliGRAM(s) Oral two times a day  ferrous    sulfate 325 milliGRAM(s) Oral daily  melatonin. 6 milliGRAM(s) Oral at bedtime  multivitamin 1 Tablet(s) Oral daily  nicotine - 21 mG/24Hr(s) Patch 1 Patch Transdermal daily  pantoprazole    Tablet 40 milliGRAM(s) Oral before breakfast  senna 2 Tablet(s) Oral at bedtime    MEDICATIONS  (PRN):  acetaminophen     Tablet .. 650 milliGRAM(s) Oral every 6 hours PRN Mild Pain (1 - 3), Moderate Pain (4 - 6)  aluminum hydroxide/magnesium hydroxide/simethicone Suspension 30 milliLiter(s) Oral every 4 hours PRN Dyspepsia  benztropine Injectable 2 milliGRAM(s) IntraMuscular once PRN EPS ppx  diphenhydrAMINE 25 milliGRAM(s) Oral every 6 hours PRN Rash and/or Itching  hydrOXYzine hydrochloride 25 milliGRAM(s) Oral every 8 hours PRN anxiety  LORazepam   Injectable 2 milliGRAM(s) IntraMuscular once PRN severe agitation  mupirocin 2% Ointment 1 Application(s) Topical two times a day PRN rash  nicotine  Polacrilex Gum 4 milliGRAM(s) Oral every 2 hours PRN breakthrough cravings

## 2023-04-27 NOTE — BH INPATIENT PSYCHIATRY PROGRESS NOTE - PRN MEDS
MEDICATIONS  (PRN):  acetaminophen     Tablet .. 650 milliGRAM(s) Oral every 6 hours PRN Mild Pain (1 - 3), Moderate Pain (4 - 6)  aluminum hydroxide/magnesium hydroxide/simethicone Suspension 30 milliLiter(s) Oral every 4 hours PRN Dyspepsia  benztropine Injectable 2 milliGRAM(s) IntraMuscular once PRN EPS ppx  diphenhydrAMINE 25 milliGRAM(s) Oral every 6 hours PRN Rash and/or Itching  hydrOXYzine hydrochloride 25 milliGRAM(s) Oral every 8 hours PRN anxiety  LORazepam   Injectable 2 milliGRAM(s) IntraMuscular once PRN severe agitation  mupirocin 2% Ointment 1 Application(s) Topical two times a day PRN rash  nicotine  Polacrilex Gum 4 milliGRAM(s) Oral every 2 hours PRN breakthrough cravings

## 2023-04-27 NOTE — BH INPATIENT PSYCHIATRY PROGRESS NOTE - MSE UNSTRUCTURED FT
24yo man with adequate grooming and hygiene, has many tattoos and body modifications/piercings of nose bridge, nose, ear hoop, oddly so. Stable gait with no EPS evident. He is calm and cooperative with mostly appropriate eye contact. Does appear mildly dissociated at times, with some flight into wellness noted. No psychomotor abnormalities noted. Stated mood is "good." Affect is euthymic, but constricted with more range emerging, occasional smile, appropriate. TP is linear. TC: denies SIIP, no evidence of delusions, +hopeful. No perceptual disturbances noted. Insight and judgment are improving and focus is more on sister and her behavior than himself. Impulse control is intact at this time. alert and oriented x 3, reliability is impaired but improving.
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
## 2023-04-27 NOTE — BH INPATIENT PSYCHIATRY PROGRESS NOTE - NSBHCHARTREVIEWVS_PSY_A_CORE FT
Vital Signs Last 24 Hrs  T(C): 36.6 (04-28-23 @ 20:41), Max: 36.6 (04-28-23 @ 20:41)  T(F): 97.8 (04-28-23 @ 20:41), Max: 97.8 (04-28-23 @ 20:41)  HR: --  BP: --  BP(mean): --  RR: --  SpO2: --    Orthostatic VS  04-28-23 @ 20:41  Lying BP: --/-- HR: --  Sitting BP: 126/77 HR: 82  Standing BP: 117/73 HR: 91  Site: --  Mode: --  Orthostatic VS  04-28-23 @ 06:38  Lying BP: --/-- HR: --  Sitting BP: 115/76 HR: 60  Standing BP: 125/83 HR: 68  Site: --  Mode: --  Orthostatic VS  04-27-23 @ 20:35  Lying BP: --/-- HR: --  Sitting BP: 109/67 HR: 93  Standing BP: 112/69 HR: 101  Site: --  Mode: --  Orthostatic VS  04-27-23 @ 07:44  Lying BP: --/-- HR: --  Sitting BP: 97/57 HR: 65  Standing BP: 119/84 HR: 81  Site: --  Mode: --

## 2023-04-28 PROCEDURE — 99232 SBSQ HOSP IP/OBS MODERATE 35: CPT

## 2023-04-28 PROCEDURE — 90853 GROUP PSYCHOTHERAPY: CPT

## 2023-04-28 RX ORDER — BENZTROPINE MESYLATE 1 MG
2 TABLET ORAL
Refills: 0 | Status: DISCONTINUED | OUTPATIENT
Start: 2023-04-28 | End: 2023-05-01

## 2023-04-28 RX ORDER — ARIPIPRAZOLE 15 MG/1
30 TABLET ORAL AT BEDTIME
Refills: 0 | Status: COMPLETED | OUTPATIENT
Start: 2023-04-28 | End: 2023-04-28

## 2023-04-28 RX ORDER — ARIPIPRAZOLE 15 MG/1
662 TABLET ORAL ONCE
Refills: 0 | Status: COMPLETED | OUTPATIENT
Start: 2023-05-01 | End: 2023-05-01

## 2023-04-28 RX ORDER — DIPHENHYDRAMINE HCL 50 MG
25 CAPSULE ORAL EVERY 6 HOURS
Refills: 0 | Status: DISCONTINUED | OUTPATIENT
Start: 2023-04-28 | End: 2023-05-02

## 2023-04-28 RX ORDER — ARIPIPRAZOLE 15 MG/1
675 TABLET ORAL ONCE
Refills: 0 | Status: COMPLETED | OUTPATIENT
Start: 2023-04-28 | End: 2023-04-28

## 2023-04-28 RX ADMIN — ARIPIPRAZOLE 675 MILLIGRAM(S): 15 TABLET ORAL at 15:14

## 2023-04-28 RX ADMIN — Medication 6 MILLIGRAM(S): at 20:08

## 2023-04-28 RX ADMIN — Medication 4 MILLIGRAM(S): at 20:10

## 2023-04-28 RX ADMIN — PANTOPRAZOLE SODIUM 40 MILLIGRAM(S): 20 TABLET, DELAYED RELEASE ORAL at 07:56

## 2023-04-28 RX ADMIN — Medication 4 MILLIGRAM(S): at 17:32

## 2023-04-28 RX ADMIN — Medication 2 MILLIGRAM(S): at 20:07

## 2023-04-28 RX ADMIN — Medication 1.5 MILLIGRAM(S): at 08:46

## 2023-04-28 RX ADMIN — Medication 1 PATCH: at 08:45

## 2023-04-28 RX ADMIN — Medication 25 MILLIGRAM(S): at 18:34

## 2023-04-28 RX ADMIN — Medication 1 TABLET(S): at 08:46

## 2023-04-28 RX ADMIN — Medication 325 MILLIGRAM(S): at 08:46

## 2023-04-28 RX ADMIN — SENNA PLUS 2 TABLET(S): 8.6 TABLET ORAL at 20:07

## 2023-04-28 RX ADMIN — Medication 650 MILLIGRAM(S): at 20:07

## 2023-04-28 RX ADMIN — ARIPIPRAZOLE 30 MILLIGRAM(S): 15 TABLET ORAL at 20:07

## 2023-04-28 NOTE — BH PSYCHOLOGY - GROUP THERAPY NOTE - NSPSYCHOLGRPCOGGOAL_PSY_A_CORE FT
Decrease symptoms, Develop coping/emotion regulation skills, Increase value-based action, Psychoeducation 

## 2023-04-28 NOTE — BH INPATIENT PSYCHIATRY PROGRESS NOTE - PRN MEDS
MEDICATIONS  (PRN):  acetaminophen     Tablet .. 650 milliGRAM(s) Oral every 6 hours PRN Mild Pain (1 - 3), Moderate Pain (4 - 6)  aluminum hydroxide/magnesium hydroxide/simethicone Suspension 30 milliLiter(s) Oral every 4 hours PRN Dyspepsia  benztropine Injectable 2 milliGRAM(s) IntraMuscular once PRN EPS ppx  diphenhydrAMINE 25 milliGRAM(s) Oral every 6 hours PRN Rash and/or Itching  hydrOXYzine hydrochloride 25 milliGRAM(s) Oral every 8 hours PRN anxiety  LORazepam   Injectable 2 milliGRAM(s) IntraMuscular once PRN severe agitation  mupirocin 2% Ointment 1 Application(s) Topical two times a day PRN rash  nicotine  Polacrilex Gum 4 milliGRAM(s) Oral every 2 hours PRN breakthrough cravings   MEDICATIONS  (PRN):  acetaminophen     Tablet .. 650 milliGRAM(s) Oral every 6 hours PRN Mild Pain (1 - 3), Moderate Pain (4 - 6)  aluminum hydroxide/magnesium hydroxide/simethicone Suspension 30 milliLiter(s) Oral every 4 hours PRN Dyspepsia  benztropine Injectable 2 milliGRAM(s) IntraMuscular once PRN EPS ppx  diphenhydrAMINE 25 milliGRAM(s) Oral every 6 hours PRN Rash and/or Itching  hydrOXYzine hydrochloride 25 milliGRAM(s) Oral every 8 hours PRN anxiety  mupirocin 2% Ointment 1 Application(s) Topical two times a day PRN rash  nicotine  Polacrilex Gum 4 milliGRAM(s) Oral every 2 hours PRN breakthrough cravings

## 2023-04-28 NOTE — BH INPATIENT PSYCHIATRY PROGRESS NOTE - MSE UNSTRUCTURED FT
24yo man with adequate grooming and hygiene, has many tattoos and body modifications/piercings of nose bridge, nose, ear hoop, oddly so. Stable gait with no EPS evident. He is calm and cooperative with mostly appropriate eye contact. Does appear mildly dissociated at times, with some flight into wellness noted. No psychomotor abnormalities noted. Stated mood is "good." Affect is euthymic, but constricted with more range emerging, occasional smile, appropriate. TP is linear. TC: denies SIIP, no evidence of delusions, +hopeful. No perceptual disturbances noted. Insight and judgment are improving and focus is more on sister and her behavior than himself. Impulse control is intact at this time. alert and oriented x 3, reliability is impaired but improving. 22yo man with adequate grooming and hygiene, has many tattoos and body modifications/piercings of nose bridge, nose, ear hoop, oddly so. Stable gait with no EPS evident. He is calm and cooperative with mostly appropriate eye contact. Does appear mildly dissociated at times, with some flight into wellness noted, attenuating. No psychomotor abnormalities noted. Stated mood is "good and ready to go soon... I want to do the PHP." Affect is euthymic, but constricted with more range emerging, occasional smile, appropriate. TP is linear. TC: denies SIIP, no evidence of delusions, +hopeful. No perceptual disturbances noted. Insight and judgment are improving and focus is more on sister and her behavior than himself. Impulse control is intact at this time. alert and oriented x 3, reliability is impaired but improving.
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
## 2023-04-28 NOTE — BH INPATIENT PSYCHIATRY PROGRESS NOTE - NSBHCHARTREVIEWVS_PSY_A_CORE FT
Vital Signs Last 24 Hrs  T(C): 36.6 (04-28-23 @ 20:41), Max: 36.6 (04-28-23 @ 20:41)  T(F): 97.8 (04-28-23 @ 20:41), Max: 97.8 (04-28-23 @ 20:41)  HR: --  BP: --  BP(mean): --  RR: --  SpO2: --    Orthostatic VS  04-28-23 @ 20:41  Lying BP: --/-- HR: --  Sitting BP: 126/77 HR: 82  Standing BP: 117/73 HR: 91  Site: --  Mode: --  Orthostatic VS  04-28-23 @ 06:38  Lying BP: --/-- HR: --  Sitting BP: 115/76 HR: 60  Standing BP: 125/83 HR: 68  Site: --  Mode: --  Orthostatic VS  04-27-23 @ 20:35  Lying BP: --/-- HR: --  Sitting BP: 109/67 HR: 93  Standing BP: 112/69 HR: 101  Site: --  Mode: --  Orthostatic VS  04-27-23 @ 07:44  Lying BP: --/-- HR: --  Sitting BP: 97/57 HR: 65  Standing BP: 119/84 HR: 81  Site: --  Mode: --   Vital Signs Last 24 Hrs  T(C): 36.6 (05-01-23 @ 06:41), Max: 36.6 (05-01-23 @ 06:41)  T(F): 97.8 (05-01-23 @ 06:41), Max: 97.8 (05-01-23 @ 06:41)  HR: --  BP: --  BP(mean): --  RR: --  SpO2: --    Orthostatic VS  05-01-23 @ 06:41  Lying BP: --/-- HR: --  Sitting BP: 119/77 HR: 69  Standing BP: 116/79 HR: 80  Site: --  Mode: --  Orthostatic VS  04-30-23 @ 07:58  Lying BP: --/-- HR: --  Sitting BP: 131/82 HR: 65  Standing BP: 118/86 HR: 77  Site: --  Mode: --  Orthostatic VS  04-29-23 @ 20:42  Lying BP: --/-- HR: --  Sitting BP: 109/68 HR: 88  Standing BP: 107/66 HR: 96  Site: --  Mode: --

## 2023-04-28 NOTE — BH INPATIENT PSYCHIATRY PROGRESS NOTE - CURRENT MEDICATION
MEDICATIONS  (STANDING):  benztropine 2 milliGRAM(s) Oral two times a day  ferrous    sulfate 325 milliGRAM(s) Oral daily  melatonin. 6 milliGRAM(s) Oral at bedtime  multivitamin 1 Tablet(s) Oral daily  nicotine - 21 mG/24Hr(s) Patch 1 Patch Transdermal daily  pantoprazole    Tablet 40 milliGRAM(s) Oral before breakfast  senna 2 Tablet(s) Oral at bedtime    MEDICATIONS  (PRN):  acetaminophen     Tablet .. 650 milliGRAM(s) Oral every 6 hours PRN Mild Pain (1 - 3), Moderate Pain (4 - 6)  aluminum hydroxide/magnesium hydroxide/simethicone Suspension 30 milliLiter(s) Oral every 4 hours PRN Dyspepsia  benztropine Injectable 2 milliGRAM(s) IntraMuscular once PRN EPS ppx  diphenhydrAMINE 25 milliGRAM(s) Oral every 6 hours PRN Rash and/or Itching  hydrOXYzine hydrochloride 25 milliGRAM(s) Oral every 8 hours PRN anxiety  LORazepam   Injectable 2 milliGRAM(s) IntraMuscular once PRN severe agitation  mupirocin 2% Ointment 1 Application(s) Topical two times a day PRN rash  nicotine  Polacrilex Gum 4 milliGRAM(s) Oral every 2 hours PRN breakthrough cravings   MEDICATIONS  (STANDING):  ARIPiprazole lauroxil Injectable, Long Acting (ARISTADA) 662 milliGRAM(s) IntraMuscular once  benztropine 2 milliGRAM(s) Oral two times a day  ferrous    sulfate 325 milliGRAM(s) Oral daily  melatonin. 6 milliGRAM(s) Oral at bedtime  multivitamin 1 Tablet(s) Oral daily  nicotine - 21 mG/24Hr(s) Patch 1 Patch Transdermal daily  pantoprazole    Tablet 40 milliGRAM(s) Oral before breakfast  senna 2 Tablet(s) Oral at bedtime    MEDICATIONS  (PRN):  acetaminophen     Tablet .. 650 milliGRAM(s) Oral every 6 hours PRN Mild Pain (1 - 3), Moderate Pain (4 - 6)  aluminum hydroxide/magnesium hydroxide/simethicone Suspension 30 milliLiter(s) Oral every 4 hours PRN Dyspepsia  benztropine Injectable 2 milliGRAM(s) IntraMuscular once PRN EPS ppx  diphenhydrAMINE 25 milliGRAM(s) Oral every 6 hours PRN Rash and/or Itching  hydrOXYzine hydrochloride 25 milliGRAM(s) Oral every 8 hours PRN anxiety  mupirocin 2% Ointment 1 Application(s) Topical two times a day PRN rash  nicotine  Polacrilex Gum 4 milliGRAM(s) Oral every 2 hours PRN breakthrough cravings

## 2023-04-28 NOTE — BH INPATIENT PSYCHIATRY PROGRESS NOTE - NSICDXBHPRIMARYDX_PSY_ALL_CORE
Unspecified mood [affective] disorder   F39   Bipolar disorder, current episode manic severe with psychotic features   F31.2
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
## 2023-04-29 RX ADMIN — Medication 1 PATCH: at 18:10

## 2023-04-29 RX ADMIN — PANTOPRAZOLE SODIUM 40 MILLIGRAM(S): 20 TABLET, DELAYED RELEASE ORAL at 08:01

## 2023-04-29 RX ADMIN — Medication 1 TABLET(S): at 08:31

## 2023-04-29 RX ADMIN — Medication 4 MILLIGRAM(S): at 18:39

## 2023-04-29 RX ADMIN — Medication 650 MILLIGRAM(S): at 14:14

## 2023-04-29 RX ADMIN — Medication 6 MILLIGRAM(S): at 20:05

## 2023-04-29 RX ADMIN — Medication 1 PATCH: at 08:01

## 2023-04-29 RX ADMIN — Medication 325 MILLIGRAM(S): at 08:01

## 2023-04-29 RX ADMIN — Medication 1 PATCH: at 08:31

## 2023-04-29 RX ADMIN — Medication 2 MILLIGRAM(S): at 08:00

## 2023-04-29 RX ADMIN — Medication 650 MILLIGRAM(S): at 08:01

## 2023-04-29 RX ADMIN — Medication 650 MILLIGRAM(S): at 08:31

## 2023-04-29 RX ADMIN — Medication 2 MILLIGRAM(S): at 20:04

## 2023-04-29 RX ADMIN — Medication 25 MILLIGRAM(S): at 18:38

## 2023-04-29 RX ADMIN — Medication 4 MILLIGRAM(S): at 08:28

## 2023-04-29 RX ADMIN — Medication 1 PATCH: at 07:26

## 2023-04-29 RX ADMIN — Medication 650 MILLIGRAM(S): at 14:44

## 2023-04-30 RX ADMIN — Medication 1 TABLET(S): at 08:15

## 2023-04-30 RX ADMIN — PANTOPRAZOLE SODIUM 40 MILLIGRAM(S): 20 TABLET, DELAYED RELEASE ORAL at 08:15

## 2023-04-30 RX ADMIN — Medication 1 PATCH: at 08:15

## 2023-04-30 RX ADMIN — Medication 650 MILLIGRAM(S): at 12:00

## 2023-04-30 RX ADMIN — SENNA PLUS 2 TABLET(S): 8.6 TABLET ORAL at 20:11

## 2023-04-30 RX ADMIN — Medication 325 MILLIGRAM(S): at 08:15

## 2023-04-30 RX ADMIN — Medication 6 MILLIGRAM(S): at 20:11

## 2023-04-30 RX ADMIN — Medication 2 MILLIGRAM(S): at 20:11

## 2023-04-30 RX ADMIN — Medication 2 MILLIGRAM(S): at 08:15

## 2023-04-30 RX ADMIN — Medication 650 MILLIGRAM(S): at 05:18

## 2023-04-30 RX ADMIN — Medication 650 MILLIGRAM(S): at 11:14

## 2023-05-01 PROCEDURE — 99233 SBSQ HOSP IP/OBS HIGH 50: CPT

## 2023-05-01 RX ORDER — BENZTROPINE MESYLATE 1 MG
1.5 TABLET ORAL
Refills: 0 | Status: DISCONTINUED | OUTPATIENT
Start: 2023-05-01 | End: 2023-05-02

## 2023-05-01 RX ADMIN — Medication 650 MILLIGRAM(S): at 10:15

## 2023-05-01 RX ADMIN — Medication 1 TABLET(S): at 09:38

## 2023-05-01 RX ADMIN — Medication 4 MILLIGRAM(S): at 05:42

## 2023-05-01 RX ADMIN — Medication 25 MILLIGRAM(S): at 15:33

## 2023-05-01 RX ADMIN — Medication 4 MILLIGRAM(S): at 15:41

## 2023-05-01 RX ADMIN — Medication 2 MILLIGRAM(S): at 09:37

## 2023-05-01 RX ADMIN — Medication 325 MILLIGRAM(S): at 09:37

## 2023-05-01 RX ADMIN — PANTOPRAZOLE SODIUM 40 MILLIGRAM(S): 20 TABLET, DELAYED RELEASE ORAL at 09:37

## 2023-05-01 RX ADMIN — Medication 1 PATCH: at 09:46

## 2023-05-01 RX ADMIN — Medication 6 MILLIGRAM(S): at 20:09

## 2023-05-01 RX ADMIN — Medication 1.5 MILLIGRAM(S): at 20:09

## 2023-05-01 RX ADMIN — Medication 650 MILLIGRAM(S): at 09:45

## 2023-05-01 RX ADMIN — ARIPIPRAZOLE 662 MILLIGRAM(S): 15 TABLET ORAL at 11:34

## 2023-05-01 NOTE — BH INPATIENT PSYCHIATRY PROGRESS NOTE - NSBHCHARTREVIEWVS_PSY_A_CORE FT
Vital Signs Last 24 Hrs  T(C): 36.6 (05-01-23 @ 21:24), Max: 36.6 (05-01-23 @ 06:41)  T(F): 97.9 (05-01-23 @ 21:24), Max: 97.9 (05-01-23 @ 21:24)  HR: --  BP: --  BP(mean): --  RR: --  SpO2: --    Orthostatic VS  05-01-23 @ 21:24  Lying BP: --/-- HR: --  Sitting BP: 131/82 HR: 73  Standing BP: 112/72 HR: 83  Site: --  Mode: --  Orthostatic VS  05-01-23 @ 06:41  Lying BP: --/-- HR: --  Sitting BP: 119/77 HR: 69  Standing BP: 116/79 HR: 80  Site: --  Mode: --  Orthostatic VS  04-30-23 @ 07:58  Lying BP: --/-- HR: --  Sitting BP: 131/82 HR: 65  Standing BP: 118/86 HR: 77  Site: --  Mode: --

## 2023-05-01 NOTE — BH INPATIENT PSYCHIATRY PROGRESS NOTE - MSE UNSTRUCTURED FT
22yo man with adequate grooming and hygiene, has many tattoos and body modifications/piercings of nose bridge, nose, ear hoop, oddly so. Stable gait with no EPS evident. He is calm and cooperative with appropriate eye contact and tolerated family meeting well. Does appear mildly dissociated at times, with some flight into wellness noted, attenuating. No psychomotor abnormalities noted. Stated mood is "good and ready to go soon... I want to do the PHP." Affect is euthymic, but constricted with more range emerging, occasional smile, appropriate. TP is linear. TC: denies SIIP, no evidence of delusions, +hopeful. No perceptual disturbances noted. Insight and judgment are improving and focus is more on sister and her behavior than himself. Impulse control is intact at this time. alert and oriented x 3, reliability is impaired but improving.
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
## 2023-05-01 NOTE — BH INPATIENT PSYCHIATRY PROGRESS NOTE - PRN MEDS
MEDICATIONS  (PRN):  acetaminophen     Tablet .. 650 milliGRAM(s) Oral every 6 hours PRN Mild Pain (1 - 3), Moderate Pain (4 - 6)  aluminum hydroxide/magnesium hydroxide/simethicone Suspension 30 milliLiter(s) Oral every 4 hours PRN Dyspepsia  benztropine Injectable 2 milliGRAM(s) IntraMuscular once PRN EPS ppx  diphenhydrAMINE 25 milliGRAM(s) Oral every 6 hours PRN Rash and/or Itching  hydrOXYzine hydrochloride 25 milliGRAM(s) Oral every 8 hours PRN anxiety  mupirocin 2% Ointment 1 Application(s) Topical two times a day PRN rash  nicotine  Polacrilex Gum 4 milliGRAM(s) Oral every 2 hours PRN breakthrough cravings

## 2023-05-01 NOTE — BH INPATIENT PSYCHIATRY PROGRESS NOTE - CURRENT MEDICATION
MEDICATIONS  (STANDING):  benztropine 1.5 milliGRAM(s) Oral two times a day  ferrous    sulfate 325 milliGRAM(s) Oral daily  melatonin. 6 milliGRAM(s) Oral at bedtime  multivitamin 1 Tablet(s) Oral daily  nicotine - 21 mG/24Hr(s) Patch 1 Patch Transdermal daily  pantoprazole    Tablet 40 milliGRAM(s) Oral before breakfast  senna 2 Tablet(s) Oral at bedtime    MEDICATIONS  (PRN):  acetaminophen     Tablet .. 650 milliGRAM(s) Oral every 6 hours PRN Mild Pain (1 - 3), Moderate Pain (4 - 6)  aluminum hydroxide/magnesium hydroxide/simethicone Suspension 30 milliLiter(s) Oral every 4 hours PRN Dyspepsia  benztropine Injectable 2 milliGRAM(s) IntraMuscular once PRN EPS ppx  diphenhydrAMINE 25 milliGRAM(s) Oral every 6 hours PRN Rash and/or Itching  hydrOXYzine hydrochloride 25 milliGRAM(s) Oral every 8 hours PRN anxiety  mupirocin 2% Ointment 1 Application(s) Topical two times a day PRN rash  nicotine  Polacrilex Gum 4 milliGRAM(s) Oral every 2 hours PRN breakthrough cravings

## 2023-05-02 VITALS — TEMPERATURE: 98 F

## 2023-05-02 PROCEDURE — 99239 HOSP IP/OBS DSCHRG MGMT >30: CPT

## 2023-05-02 RX ORDER — ARIPIPRAZOLE 15 MG/1
662 TABLET ORAL
Qty: 1 | Refills: 0
Start: 2023-05-02 | End: 2023-05-02

## 2023-05-02 RX ORDER — NICOTINE POLACRILEX 2 MG
1 GUM BUCCAL
Qty: 2 | Refills: 0
Start: 2023-05-02 | End: 2023-05-31

## 2023-05-02 RX ORDER — BENZTROPINE MESYLATE 1 MG
1.5 TABLET ORAL
Qty: 22.5 | Refills: 0
Start: 2023-05-02 | End: 2023-05-16

## 2023-05-02 RX ADMIN — Medication 650 MILLIGRAM(S): at 12:06

## 2023-05-02 RX ADMIN — Medication 25 MILLIGRAM(S): at 05:23

## 2023-05-02 RX ADMIN — Medication 1 PATCH: at 09:32

## 2023-05-02 RX ADMIN — Medication 200 MILLIGRAM(S): at 11:27

## 2023-05-02 RX ADMIN — Medication 4 MILLIGRAM(S): at 09:33

## 2023-05-02 RX ADMIN — Medication 4 MILLIGRAM(S): at 03:31

## 2023-05-02 RX ADMIN — Medication 325 MILLIGRAM(S): at 09:31

## 2023-05-02 RX ADMIN — PANTOPRAZOLE SODIUM 40 MILLIGRAM(S): 20 TABLET, DELAYED RELEASE ORAL at 09:31

## 2023-05-02 RX ADMIN — Medication 1.5 MILLIGRAM(S): at 09:31

## 2023-05-02 RX ADMIN — Medication 1 TABLET(S): at 09:30

## 2023-05-02 NOTE — BH INPATIENT PSYCHIATRY DISCHARGE NOTE - LEGAL HISTORY
Per last CL note dated 4/19/23-- "Awaiting for arraignment... patient under arrest at this time - officer present as well...  Dispo: needs an inpatient psychiatric admission, based on patient arrest status will need to be transfer to Forensic unit."

## 2023-05-02 NOTE — BH INPATIENT PSYCHIATRY DISCHARGE NOTE - NSDCCPCAREPLAN_GEN_ALL_CORE_FT
PRINCIPAL DISCHARGE DIAGNOSIS  Diagnosis: Bipolar disorder, unspecified  Assessment and Plan of Treatment:

## 2023-05-02 NOTE — BH PSYCHOLOGY - GROUP THERAPY NOTE - NSBHPSYCHOLPARTICIPCOMMENT_PSY_A_CORE FT
Pt actively engaged in session. He discussed his attempts to adaptively manage his frustrations over the weekend. The patient participated in white bear exercise.
Pt actively engaged in session. He discussed his concept of home - safety, "being yourself," and people who make you feel comfortable. Pt brought in idea of home sickness and feeling at home at work. 
Patient actively participated in session. Patient shared that he enjoys listening to music during the check-in exercise as well as meaningfully shared during the group discussion.
Pt joined mid-session. He participated in discussion regarding the Beach Ball metaphor
Pt meaningfully engaged in season. During the check-in he shared his favorite childhood horror movies. During the group discussion, Patient shared his experience with feeling misunderstood by educators who were unable to accommodate his difficulties with hyperactivity and the impact. Patient participated in grounding exercise.
Pt contributed to group discussion and supportively listened to peers. He related to boiling pot metaphor to understand the experience of emotional flooding. 
Pt actively engaged in group. He offered specific activities that keep him going - manja, soup dumplings, and tattoos. Pt supported peers through positive feedback and participated in values exercise. 
Pt meaningfully engaged in season. He reported that he is someone who overthinks at work and as a way to cope, he will go outside and allow himself the space for his mind to wander (setting a specific time). 
Pt meaningfully engaged in season. He shared his difficulty with experiencing changes in his routine as it leads to intense anxiety. He listened and supported his peers during group discussion.

## 2023-05-02 NOTE — BH DISCHARGE NOTE NURSING/SOCIAL WORK/PSYCH REHAB - NSDCPRRECOMMEND_PSY_ALL_CORE
Psychiatric rehabilitation recommends that patient demonstrate full outpatient psychiatric rehabilitation compliance and that he expands his knowledge/practice of personally relevant coping methods to better meet his needs. Upon discharge, patient would benefit from outpatient psychiatric treatment for ongoing medication management, individual psychotherapy, and group therapy. Patient would benefit from DBT.

## 2023-05-02 NOTE — BH TREATMENT PLAN - NSDCCRITERIA_PSY_ALL_CORE
When pt is no longer an acute or imminent risk of harm to self with a CGI- Improvement =2
When pt is no longer an acute or imminent risk of harm to self with a CGI- Improvement =2  MARCELINO  PHP
When pt is no longer an acute or imminent risk of harm to self with a CGI- Improvement =2  MARCELINO  PHP
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
## 2023-05-02 NOTE — BH PSYCHOLOGY - GROUP THERAPY NOTE - NSPSYCHOLGRPCOGPROB_PSY_A_CORE FT
Anxiety, Depression, Reduced Coping Skills, Emotion Dysregulation

## 2023-05-02 NOTE — BH DISCHARGE NOTE NURSING/SOCIAL WORK/PSYCH REHAB - NSDCPRGOAL_PSY_ALL_CORE
Patient has attended 100% of daily psychiatric groups and he was able to endorse several coping methods to better meet his needs/assist him in setting firmer boundaries. Patient reported improved optimism, improved daily functioning/self-care, and increased self-confidence since admission. Patient reported intent to maintain medication/treatment compliance in the community, and to meditate for at least 10 minutes for daily self-care. Patient endorsed an adequate support social support system in the community, he acknowledged impulse control as an area that needs to improve, and he was receptive to writer’s encouragement that he share this goal with his outpatient provider.  Patient shared short term goal of memorizing/habitually using distress tolerance and interpersonal effectiveness skills for improved symptom management as well as long term goal of improving relationships with friends and family.  Patient disclosed plan to remind himself that progress is rarely/does not have to be linear to maintain motivation towards personal goals. Patient denied suicidal ideation, homicidal ideation, psychotic symptoms, or mood symptoms and he completed patient safety plan.

## 2023-05-02 NOTE — BH INPATIENT PSYCHIATRY PROGRESS NOTE - NSTXIMPULSINTERMD_PSY_ALL_CORE
Psychopharm with possible goal of MARCELINO and supportive therapy with appropriate aftercare
Oriented - self; Oriented - place; Oriented - time
Psychopharm with possible goal of MARCELINO and supportive therapy with appropriate aftercare

## 2023-05-02 NOTE — BH TREATMENT PLAN - NSTXFALLDATEEST_PSY_ALL_CORE
4 Eyes Skin Assessment     The patient is being assess for   Transfer to New Unit    I agree that 2 RN's have performed a thorough Head to Toe Skin Assessment on the patient. ALL assessment sites listed below have been assessed. Areas assessed by both nurses:   [x]   Head, Face, and Ears   [x]   Shoulders, Back, and Chest, Abdomen  [x]   Arms, Elbows, and Hands   [x]   Coccyx, Sacrum, and Ischium  [x]   Legs, Feet, and Heels        Scattered scabbed abrasions. **SHARE this note so that the co-signing nurse is able to place an eSignature**    Co-signer eSignature: Electronically signed by Viki Marie RN on 8/11/18 at 4:43 AM    Does the Patient have Skin Breakdown?   No          Garo Prevention initiated:  NA   Wound Care Orders initiated:  NA      [facility] nurse consulted for Pressure Injury (Stage 3,4, Unstageable, DTI, NWPT, Complex wounds)and New or Established Ostomies:  NA      Primary Nurse eSignature: Electronically signed by Araceli Chacon RN on 8/11/18 at 5:13 AM 26-Apr-2023

## 2023-05-02 NOTE — BH TREATMENT PLAN - NSBHPRIMARYDX_PSY_ALL_CORE
Bipolar disorder, current episode manic severe with psychotic features    
Unspecified mood [affective] disorder    
Bipolar disorder, current episode manic severe with psychotic features

## 2023-05-02 NOTE — BH INPATIENT PSYCHIATRY PROGRESS NOTE - NSBHCHARTREVIEWVS_PSY_A_CORE FT
Vital Signs Last 24 Hrs  T(C): 36.6 (05-02-23 @ 07:28), Max: 36.6 (05-01-23 @ 21:24)  T(F): 97.8 (05-02-23 @ 07:28), Max: 97.9 (05-01-23 @ 21:24)  HR: --  BP: --  BP(mean): --  RR: --  SpO2: --    Orthostatic VS  05-02-23 @ 07:28  Lying BP: --/-- HR: --  Sitting BP: 122/83 HR: 65  Standing BP: 130/90 HR: 69  Site: --  Mode: --  Orthostatic VS  05-01-23 @ 21:24  Lying BP: --/-- HR: --  Sitting BP: 131/82 HR: 73  Standing BP: 112/72 HR: 83  Site: --  Mode: --  Orthostatic VS  05-01-23 @ 06:41  Lying BP: --/-- HR: --  Sitting BP: 119/77 HR: 69  Standing BP: 116/79 HR: 80  Site: --  Mode: --

## 2023-05-02 NOTE — BH INPATIENT PSYCHIATRY PROGRESS NOTE - NSTXDEPRESDATEEST_PSY_ALL_CORE
20-Apr-2023
26-Apr-2023

## 2023-05-02 NOTE — BH INPATIENT PSYCHIATRY PROGRESS NOTE - NSBHATTESTTYPEVISIT_PSY_A_CORE
Attending Only
TEA without on-site Attending supervision
TEA without on-site Attending supervision
Attending Only

## 2023-05-02 NOTE — BH PSYCHOLOGY - GROUP THERAPY NOTE - NSBHPSYCHOLASSESSPROV_PSY_A_CORE
Licensed Psychologist
Licensed Psychologist and Psychology Trainee
Licensed Psychologist
Licensed Psychologist and Psychology Trainee
Licensed Psychologist
Licensed Psychologist and Psychology Trainee
Licensed Psychologist and Psychology Trainee
Licensed Psychologist
Licensed Psychologist and Psychology Trainee

## 2023-05-02 NOTE — BH TREATMENT PLAN - NSTXDEPRESINTERPR_PSY_ALL_CORE
Patient has attended 100% daily psychiatric rehabilitation groups during the last 7 days, and he has demonstrated daily medication compliance without noted difficulty. Patient has demonstrated fair self-care and high visibility on the milieu, and he has been adequately engaged in the milieu. Patient was able to meaningfully discuss psychiatric rehabilitation goal progress/development during session with writer. Patient has attended most daily psychiatric rehabilitation groups, he has been polite and cooperative during interactions with writer, and continuously observed independently engaged in personally valued/constructive activities. Personal agency has been fair during the last 7 days.    Over the next 7 days, psychiatric rehabilitation staff will continue to provide supportive counseling and motivational interviewing as well as daily group therapy to assist patient in identify 2-3 effective coping methods to better meet his needs/set firmer boundaries for improved symptom management and insight.
Patient has attended 100% daily psychiatric rehabilitation groups during the last 7 days, and he has demonstrated daily medication compliance without noted difficulty. Patient has demonstrated fair self-care and high visibility on the milieu, and he has been adequately engaged in the milieu. Patient was able to meaningfully discuss psychiatric rehabilitation goal progress/development during session with writer. Patient has attended most daily psychiatric rehabilitation groups, he has been polite and cooperative during interactions with writer, and continuously observed independently engaged in personally valued/constructive activities. Personal agency has been fair during the last 7 days.    Over the next 7 days, psychiatric rehabilitation staff will continue to provide supportive counseling and motivational interviewing as well as daily group therapy to assist patient in identify 2-3 effective coping methods to better meet his needs/set firmer boundaries for improved symptom management and insight.
Over the next 7 days, psychiatric rehabilitation staff recommend the patient engage in both individual and group counseling sessions in efforts to expand coping skills and facilitate progress towards his treatment goal.

## 2023-05-02 NOTE — BH PSYCHOLOGY - GROUP THERAPY NOTE - NSBHPSYCHOLRESPONSE_PSY_A_CORE
Coping skills acquired/Insight displayed/Accepted support
Insight displayed/Accepted support
Coping skills acquired/Insight displayed/Accepted support
Coping skills acquired/Accepted support
Coping skills acquired/Insight displayed/Accepted support
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
## 2023-05-02 NOTE — BH INPATIENT PSYCHIATRY PROGRESS NOTE - NSTXIMPULSDATETRGT_PSY_ALL_CORE
26-Apr-2023
26-Apr-2023
03-May-2023
26-Apr-2023
03-May-2023
26-Apr-2023
03-May-2023

## 2023-05-02 NOTE — BH TREATMENT PLAN - NSTXPLANTHERAPYSESSIONSFT_PSY_ALL_CORE
04-27-23  Type of therapy: Other  Type of session: Individual  Level of patient participation: Attentive,Engaged,Participates  Duration of participation: 15 minutes  Therapy conducted by: Psych rehab  Therapy Summary: Writer met with patient to review progress towards psychiatric rehabilitation goal and to provide supportive counseling/motivational interviewing.   Patient reported positive occupation and social interaction as benefits of psychiatric rehabilitation group attendance, and he endorsed intent to maintain the same over the next 7 days. Patient denied any significant challenges to goal progress, and he was able to collaborate in choosing a new psychiatric rehabilitation goal as the previous one has been met. collaborate in development of a plan for psychiatric rehabilitation goal progress/development. Patient denied suicidal ideation and he contracted for safety. Patient also denied homicidal ideation, psychotic symptoms, or mood symptoms; presentation was congruent.    04-27-23  Type of therapy: Cognitive behavior therapy,Dialectical behavior therapy,Coping skills,Creative arts therapy,Health and fitness,Inspiration and motiviation,Medication management,Mindfulness,Music therapy,Peer advocate,Psychoeducation,Safety planning,Spirituality,Stress management,Symptom management,Other  Type of session: Group  Level of patient participation: Attentive,Engaged,Participates  Duration of participation: 45 minutes  Therapy conducted by: Psych rehab  Therapy Summary: Patient has attended 100% of daily psychiatric rehabilitation groups over the last 7 days. Patient has been adequately engaged during group discussions/activities, he participated without need for prompt from facilitators, and demonstrated good behavioral control in group setting. Patient agreed to maintain daily psychiatric rehabilitation groups over the next 7 days for positive occupation and social interaction.  
  04-27-23  Type of therapy: Other  Type of session: Individual  Level of patient participation: Attentive,Engaged,Participates  Duration of participation: 15 minutes  Therapy conducted by: Psych rehab  Therapy Summary: Writer met with patient to review progress towards psychiatric rehabilitation goal and to provide supportive counseling/motivational interviewing.   Patient reported positive occupation and social interaction as benefits of psychiatric rehabilitation group attendance, and he endorsed intent to maintain the same over the next 7 days. Patient denied any significant challenges to goal progress, and he was able to collaborate in choosing a new psychiatric rehabilitation goal as the previous one has been met. collaborate in development of a plan for psychiatric rehabilitation goal progress/development. Patient denied suicidal ideation and he contracted for safety. Patient also denied homicidal ideation, psychotic symptoms, or mood symptoms; presentation was congruent.    04-27-23  Type of therapy: Cognitive behavior therapy,Dialectical behavior therapy,Coping skills,Creative arts therapy,Health and fitness,Inspiration and motiviation,Medication management,Mindfulness,Music therapy,Peer advocate,Psychoeducation,Safety planning,Spirituality,Stress management,Symptom management,Other  Type of session: Group  Level of patient participation: Attentive,Engaged,Participates  Duration of participation: 45 minutes  Therapy conducted by: Psych rehab  Therapy Summary: Patient has attended 100% of daily psychiatric rehabilitation groups over the last 7 days. Patient has been adequately engaged during group discussions/activities, he participated without need for prompt from facilitators, and demonstrated good behavioral control in group setting. Patient agreed to maintain daily psychiatric rehabilitation groups over the next 7 days for positive occupation and social interaction.

## 2023-05-02 NOTE — BH DISCHARGE NOTE NURSING/SOCIAL WORK/PSYCH REHAB - NSCDUDCCRISIS_PSY_A_CORE
.  [facility]  [address] [place] [id number] [phone]   Hours:  Monday through Friday from 9 AM to 3 PM

## 2023-05-02 NOTE — BH INPATIENT PSYCHIATRY PROGRESS NOTE - NSTXDCFAMDATEEST_PSY_ALL_CORE
28-Apr-2023
21-Apr-2023
28-Apr-2023
21-Apr-2023

## 2023-05-02 NOTE — BH TREATMENT PLAN - NSTXDCFAMINTERSW_PSY_ALL_CORE
SW will provide support, education and ongoing discussion of dc plans
SW will provide support, education and ongoing discussion of dc plans

## 2023-05-02 NOTE — BH INPATIENT PSYCHIATRY PROGRESS NOTE - NSTXIMPULSDATEEST_PSY_ALL_CORE
19-Apr-2023
26-Apr-2023
19-Apr-2023
26-Apr-2023
26-Apr-2023
19-Apr-2023
26-Apr-2023
26-Apr-2023

## 2023-05-02 NOTE — BH INPATIENT PSYCHIATRY PROGRESS NOTE - NSBHATTESTBILLING_PSY_A_CORE
16390-Nbsfnxxief OBS or IP - moderate complexity OR 35-49 mins
46060-Umgbrbnwnq OBS or IP - moderate complexity OR 35-49 mins
51372-Omghzdoipo OBS or IP - moderate complexity OR 35-49 mins
90202-Kczlzutype OBS or IP - high complexity OR 50-79 mins
04509-Sdbpicpojg OBS or IP - low complexity OR 25-34 mins
47520-Ynrhvugjat OBS or IP - moderate complexity OR 35-49 mins
07922-Rosuqhyiqt OBS or IP - moderate complexity OR 35-49 mins
96924-Cjswbgywvz OBS or IP - high complexity OR 50-79 mins
38777-Cihzgmkgbc OBS or IP - moderate complexity OR 35-49 mins
19117-Vbvfmblyhu OBS or IP - moderate complexity OR 35-49 mins
69205-Nqbplgwwxa OBS or IP - moderate complexity OR 35-49 mins

## 2023-05-02 NOTE — BH INPATIENT PSYCHIATRY PROGRESS NOTE - NSTXFALLDATETRGT_PSY_ALL_CORE
03-May-2023
27-Apr-2023
03-May-2023
27-Apr-2023
03-May-2023

## 2023-05-02 NOTE — BH TREATMENT PLAN - NSTXFALLGOAL_PSY_ALL_CORE
Call for assistance before getting out of bed or chair
Call for assistance before getting out of bed or chair

## 2023-05-02 NOTE — BH INPATIENT PSYCHIATRY PROGRESS NOTE - NSDCCRITERIA_PSY_ALL_CORE
When pt is no longer an acute or imminent risk of harm to self with a CGI- Improvement =2
When pt is no longer an acute or imminent risk of harm to self with a CGI- Improvement =2  MARCELINO  PHP
When pt is no longer an acute or imminent risk of harm to self with a CGI- Improvement =2
When pt is no longer an acute or imminent risk of harm to self with a CGI- Improvement =2  MARCELINO  PHP
When pt is no longer an acute or imminent risk of harm to self with a CGI- Improvement =2

## 2023-05-02 NOTE — BH PSYCHOLOGY - GROUP THERAPY NOTE - TOKEN PULL-DIAGNOSIS
Primary Diagnosis:  Unspecified mood [affective] disorder [F39]        Problem Dx:   Suicide attempt [T14.91XA]      Iron deficiency anemia [D50.9]      History of bariatric surgery [Z98.84]      
Primary Diagnosis:  Bipolar disorder, current episode manic severe with psychotic features [F31.2]      Unspecified mood [affective] disorder [F39]        Problem Dx:   Suicide attempt [T14.91XA]      Iron deficiency anemia [D50.9]      History of bariatric surgery [Z98.84]      
Primary Diagnosis:  Bipolar disorder, current episode manic severe with psychotic features [F31.2]      Unspecified mood [affective] disorder [F39]        Problem Dx:   Suicide attempt [T14.91XA]      Iron deficiency anemia [D50.9]      History of bariatric surgery [Z98.84]      
Primary Diagnosis:  Unspecified mood [affective] disorder [F39]        Problem Dx:   Suicide attempt [T14.91XA]      Iron deficiency anemia [D50.9]      History of bariatric surgery [Z98.84]      

## 2023-05-02 NOTE — BH INPATIENT PSYCHIATRY PROGRESS NOTE - NSTXDEPRESPROGRES_PSY_ALL_CORE
Met - goal discontinued
Improving

## 2023-05-02 NOTE — BH INPATIENT PSYCHIATRY PROGRESS NOTE - NSTXSUICIDGOAL_PSY_ALL_CORE
Be able to report that they independently used a coping skill instead of hurting oneself
Will verbalize a decrease in preoccupation with suicidal thoughts and / or intent to commit suicide to 2 on a 10-point scale
Will verbalize a decrease in preoccupation with suicidal thoughts and / or intent to commit suicide to 2 on a 10-point scale
Be able to report that they independently used a coping skill instead of hurting oneself
Will verbalize a decrease in preoccupation with suicidal thoughts and / or intent to commit suicide to 2 on a 10-point scale
Be able to report that they independently used a coping skill instead of hurting oneself
Will verbalize a decrease in preoccupation with suicidal thoughts and / or intent to commit suicide to 2 on a 10-point scale
Will verbalize a decrease in preoccupation with suicidal thoughts and / or intent to commit suicide to 2 on a 10-point scale

## 2023-05-02 NOTE — BH INPATIENT PSYCHIATRY PROGRESS NOTE - NSBHTIMEACTIVITIESPERFORMED_PSY_A_CORE
Case reviewed at team. MARCELINO loading discussed with pt in AM, initiated on weekend.  In PM today, Family meeting with pt, SW, mother and MD via phone.  Later in PM, met with pt, discussed dissociation and gave pt DES2 scale.
Case reviewed at team. MARCELINO loading discussed with pt in AM, initiated on weekend.  In PM today, Family meeting with pt, SW, mother and MD via phone.  Later in PM, met with pt, discussed dissociation and gave pt DES2 scale.

## 2023-05-02 NOTE — BH PSYCHOLOGY - GROUP THERAPY NOTE - NSPSYCHOLGRPCOGPT_PSY_A_CORE FT
Patient attended recovery oriented/acceptance & commitment therapy group. Group started with check in prompt to increase socialization and engagement (favorite game). Members shared favored games like Gigwell, Ensocare, Qlusters, and Cinsay. Members demonstrated interest in hearing about their peers' game choices which facilitated dialogue. Session then focused on cognitive defusion skill building. Facilitator explained purpose of cognitive defusion - to gain distance and perspective from our thoughts to increase our ability to respond in a way that aligns with our values/goals. Members engaged in "leaves on a stream" exercise where they were asked to imagine their mind as a stream and to place each thought/feelings/sensation on a leaf and allow it to float down the stream. Members shared examples of current thoughts - "I want a cigarette," "I'm really stressed right now," and "I want to get out of here." Members were guided through the exercise and processed their reactions. Members reported that they found the exercise "calming," and noticed that their minds became quieter as the exercise progressed. Other peers noted that they found themselves stuck on a thought. Encouraged members to practice this exercise when finding themselves stuck or hooked by a specific thought or feeling.   facilitated discussion of concepts, encouraged active participation, and supported members providing feedback to peers.
Patient attended recovery oriented/acceptance & commitment therapy group. Group started with check in exercise (what is their favorite pool and/or beach activity). Members shared their favorite activities including diving into the water, putting their feet in the water and sand, playing pool games with family, and tanning to facilitate engagement and socialization. Members then focused on "Beach Ball" metaphor to foster understanding of concept of coping with unpleasant thoughts and emotions via acceptance; allowing members to engage in value-based actions. Members discussed how consistently trying to hold the beach ball under water limits actions. Members reflected on their own beach balls (i.e., paranoia, anxiety, depression) and favorite pool activities (i.e., spending time with family, attending sports games).  facilitated discussion of concepts, encouraged active participation, and supported members providing feedback to peers.
Patient attended recovery oriented/acceptance & commitment therapy group. Group started with check in to increase engagement and dialogue (“what keeps you going”). Members stated that family, Gd, gratitude, food, themselves, freedom, hot water, creativity and music keeps them motivated. Group then focused on personal values and their importance/satisfaction for peers. Group utilized memory game where peers had to find matching value pair. After each match, members reflected on their values and described how they hope to incorporate that value in their lives.  facilitated discussion of concepts, encouraged active participation, and supported members providing feedback to peers.
Patient attended recovery oriented/acceptance & commitment therapy group. Group started with prompt to increase engagement (when are they most present). Members noted that they are most present while playing an instrument (i.e., guitar), listening to music, being in nature, and while working. Members shared the difficulties they experienced engaging in behaviors consistent with personally chosen values when experiencing internal struggles. Members then focused on completing "Values" worksheet to clarify their own personal values like self care, productivity, and contribution. Members discussed their experience of attempting to clarify their own personal values.  facilitated discussion of concepts, encouraged active participation, and supported members providing feedback to peers.
Patient attended recovery oriented/acceptance & commitment therapy group. Group started with engagement check in (favorite season and why). Members identified summer and spring as popular seasons because of the temperature and outdoor activities (beach, kayak, sunbathing). Facilitator framed interest in being outdoors as a method for practicing mindfulness. Group then transitioned to topic of overthinking and ways to respond to overthinking. Members shared thoughts that show up for them when they engage in overthinking - worry thoughts, depressed thoughts, "I want to get out of the hospital," and evaluative thoughts about self. Facilitator framed overthinking as a natural consequence of the way our minds evolved. Group then reviewed "Ways to Stop Overthinking" handout and reviewed strategies like setting a specific time to think about what is bothering you, writing down your thoughts, asking yourself if this will matter in 5 years, reminding yourself you cannot control everything and forgiving yourself for what you have been holding onto. Members shared strategies they would like to try or keep implementing.  facilitated discussion of concepts, encouraged active participation, and supported members providing feedback to peers. 
Patient attended recovery oriented/acceptance & commitment therapy group. Group started with check in on adventure and travel (where you would like to travel and why). Overwhelmingly, pts identified their desire to travel to their home countries to connect with their culture or family. Members reflected on the similarities in responses - themes of family and home. Group then focused on their connection to home, the meaning of finding a home, and how they think about home. Members defined home as shelter, safety, being oneself, and connection. This led to discussion on foods that taste like home - members shared memories of foods they shared with people who they consider family. Members were encouraged to reflect on how they want to cultivate home in their lives.  facilitated discussion of concepts, encouraged active participation, and supported members providing feedback to peers.
Patient attended recovery oriented/acceptance & commitment therapy group. Group started with check-in exercise ("what was your favorite childhood movie?") to increase engagement and socialization. Members shared their favorite childhood movies including Mulan, Dumbo, Mert, Over the Hedge, and others. Members reflected on the emotions these movies elicited. Group then focused on the benefits of labeling and identifying emotions when feeling emotionally flooded. Members reflected on how they tend to experience these unpleasant emotions/states in their bodies including feeling tightness in the chest and throat when feeling anxious. Members noted their shared experience of sensory overload due to the heightened noise level on the unit and the impact on their ability to fully engage in the present in the session. The group then engaged in Using Five Senses grounding exercise to facilitate engagement in present moment and reduce impact of difficult emotions and thoughts. Members noticed the paintings, the squeaking and whooshing of the floor buffer, and the feel of their chairs. Members shared their experiences engaging in the exercise including feeling more relaxed and present but still anxious.  facilitated discussion of concepts, encouraged active participation, and supported members providing feedback to peers. 
Patient attended recovery oriented/acceptance & commitment therapy group. Group started with check in exercise on difficulties experienced by patients over the weekend. Members shared their sense of frustration, boredom, and feeling unheard by others over the weekend. Members reflected on how they coped with these unpleasant emotions and thoughts. Group then engaged in thought suppression exercise (White Bear). Members reflected on their increased tendency to think about the white bear while actively trying to suppress that image/thought. Members discussed their own "white bears" as well as more adaptive ways to manage unpleasant inner experiences (i.e., engaging in absorbing distraction, setting designated "white bear" time, engaging in mindfulness exercise).  facilitated discussion of concepts, encouraged active participation, and supported members providing feedback to peers.
Patient attended recovery oriented/acceptance & commitment therapy group. Group started with mindful stretching exercise to increase engagement with the body. Patients then engaged in check-in exercise sharing which emotions they felt the most discomfort with. Members shared the greatest difficulty with feeling a loss of control, disorganization, periods of transition, anxiety, and fear of the unknown. Members then focused on labeling and identifying emotions. Members reflected on how they tend to experience these unpleasant emotions/states in their bodies including feeling anxiety in their chest and stomach, and anger in head and fists. Members noted their shared discomfort with the noise level on the unit, impacting their ability to fully engage in the session. Members acknowledged that the noise level on the unit mimics the imperfect situations they need to contend with in their day to day lives.  facilitated discussion of concepts, encouraged active participation, and supported members providing feedback to peers.

## 2023-05-02 NOTE — BH TREATMENT PLAN - NSTXCOPEINTERMD_PSY_ALL_CORE
Psychopharm with possible goal of MARCELINO and supportive therapy with appropriate aftercare
Psychopharm with possible goal of MARCELINO and supportive therapy with appropriate aftercare
Psychopharm with possible goal of MARCELNIO and supportive therapy with appropriate aftercare

## 2023-05-02 NOTE — BH INPATIENT PSYCHIATRY PROGRESS NOTE - NSTXIMPULSGOAL_PSY_ALL_CORE
Will identify 1 strategy to interrupt impulsive thoughts
Will be able to demonstrate the ability to pause before acting out negatively
Will identify 1 strategy to interrupt impulsive thoughts
Will be able to demonstrate the ability to pause before acting out negatively

## 2023-05-02 NOTE — BH TREATMENT PLAN - NSTXIMPULSINTERMD_PSY_ALL_CORE
Psychopharm with possible goal of MARCELINO and supportive therapy with appropriate aftercare
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
## 2023-05-02 NOTE — BH INPATIENT PSYCHIATRY PROGRESS NOTE - NSTXDEPRESDATETRGT_PSY_ALL_CORE
27-Apr-2023
03-May-2023
27-Apr-2023

## 2023-05-02 NOTE — BH INPATIENT PSYCHIATRY PROGRESS NOTE - NSTXFALLGOAL_PSY_ALL_CORE
Verbalize understanding of fall prevention interventions
Call for assistance before getting out of bed or chair
Verbalize understanding of fall prevention interventions
Call for assistance before getting out of bed or chair
Verbalize understanding of fall prevention interventions
Call for assistance before getting out of bed or chair
Call for assistance before getting out of bed or chair
Verbalize understanding of fall prevention interventions
Call for assistance before getting out of bed or chair

## 2023-05-02 NOTE — BH INPATIENT PSYCHIATRY DISCHARGE NOTE - ATTENDING DISCHARGE PHYSICAL EXAMINATION:
Stable for DC to home  PHP on THURS  MARCELINO loadings done  Testosterone IM given today, q 2 weekly  Denies all SHIIP  Stimulant stopped  Rx meds filled Stable for DC to home  PHP on THURS  MARCELINO loadings done  Testosterone IM given today, q 2 weekly-- DUAL MARCELINO regimen  Denies all SHIIP  Stimulant stopped  Rx meds filled

## 2023-05-02 NOTE — BH PSYCHOLOGY - GROUP THERAPY NOTE - NSPSYCHOLGRPCOGPT_PSY_A_CORE
participated in exercise/shared positive coping experience/other...
shared positive coping experience/other...
shared positive coping experience/other...
participated in exercise/shared positive coping experience/other...
other...
participated in exercise/shared positive coping experience/other...
participated in exercise/shared positive coping experience/other...
shared positive coping experience/other...
participated in exercise/shared positive coping experience/other...
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
## 2023-05-02 NOTE — BH INPATIENT PSYCHIATRY DISCHARGE NOTE - NSBHDCHANDOFFFT_PSY_ALL_CORE
Signout to PHP Dr. Davis and MOS[facility] Rivero as PHP starts 5/4/23. Pt on MARCELINO medication. Stimulant medication stopped. Signout to PHP Dr. Davis and [facility] Rivero as PHP starts 5/4/23. Pt on DUAL MARCELINO medication. Stimulant medication stopped.
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
## 2023-05-02 NOTE — BH TREATMENT PLAN - NSTXIMPULSGOAL_PSY_ALL_CORE
Will be able to demonstrate the ability to pause before acting out negatively
Will be able to demonstrate the ability to pause before acting out negatively
Will identify 1 strategy to interrupt impulsive thoughts

## 2023-05-02 NOTE — BH DISCHARGE NOTE NURSING/SOCIAL WORK/PSYCH REHAB - NSBHDCADDR1FT_A_CORE
Virtual for Thursday and Friday. Monday Starts IN PERSON. [id number] [facility] [id number]. ACP bldg 2nd fl

## 2023-05-02 NOTE — BH INPATIENT PSYCHIATRY PROGRESS NOTE - MSE UNSTRUCTURED FT
24yo man with adequate grooming and hygiene, has many tattoos and body modifications/piercings of nose bridge, nose, ear hoop, oddly so. Stable gait with no EPS evident. He is calm and cooperative with appropriate eye contact and tolerated family meeting well, stable and appropriate for DC. Does appear mildly dissociated at times, with some flight into wellness noted, attenuating. No psychomotor abnormalities noted. Stated mood is "good and ready to go soon... I want to do the PHP." Affect is euthymic, but constricted with more range emerging, occasional smile, appropriate. TP is linear. TC: denies SIIP, no evidence of delusions, +hopeful. No perceptual disturbances noted. Insight and judgment are improving and focus is more on sister and her behavior than himself. Impulse control is intact at this time. alert and oriented x 3, reliability is improved.

## 2023-05-02 NOTE — BH INPATIENT PSYCHIATRY DISCHARGE NOTE - VIOLENCE RISK FACTORS:
Feeling of being under threat and being unable to control threat/Affective dysregulation/Impulsivity/Community stressors that increase the risk of destabilization/Irritability

## 2023-05-02 NOTE — BH TREATMENT PLAN - NSTXCOPEINTERPR_PSY_ALL_CORE
During the current hospitalization, psychiatric rehabilitation staff has provided individual supportive counseling/motivational interviewing sessions and group therapy sessions to assist patient in demonstrating daily medication/treatment compliance and identifying effective coping methods to better meet his needs/assist him in setting firmer boundaries. Patient has met this goal.
During the current hospitalization, psychiatric rehabilitation staff has provided individual supportive counseling/motivational interviewing sessions and group therapy sessions to assist patient in demonstrating daily medication/treatment compliance and identifying effective coping methods to better meet his needs/assist him in setting firmer boundaries. Patient has met this goal.

## 2023-05-02 NOTE — BH INPATIENT PSYCHIATRY PROGRESS NOTE - NSTXDEPRESGOAL_PSY_ALL_CORE
Attend and participate in at least 2 groups daily despite low mood/energy
Exhibit improvements in self-grooming, hygiene, sleep and appetite
Attend and participate in at least 2 groups daily despite low mood/energy

## 2023-05-02 NOTE — BH PSYCHOLOGY - GROUP THERAPY NOTE - NSPSYCHOLGRPCOGGOAL_PSY_A_CORE
other...
reduce reaction to psychosocial stressors/develop improved problem solving skills/prevent relapse of symptoms
other...
other...
reduce reaction to psychosocial stressors/prevent relapse of symptoms
other...
other...
reduce reaction to psychosocial stressors/prevent relapse of symptoms
other...

## 2023-05-02 NOTE — BH INPATIENT PSYCHIATRY DISCHARGE NOTE - NSDCMRMEDTOKEN_GEN_ALL_CORE_FT
Aristada 662 mg/2.4 mL intramuscular suspension, extended release: 662 milligram(s) intramuscularly every 4 weeks Can give 662 mg else 882 mg in 4 weeks; given with INITIO 5/1/23; NEXT DUE: 5/29/23  benztropine 1 mg oral tablet: 1.5 tab(s) orally once a day (at bedtime) can stop after 15 days if no EPS  Multiple Vitamins oral tablet: 1 tab(s) orally once a day (at bedtime)  Nicoderm C-Q Clear 21 mg/24 hr transdermal film, extended release: 1 patch transdermally once a day  omeprazole 40 mg oral delayed release capsule: 1 cap(s) orally once a day  testosterone cypionate 200 mg/mL intramuscular solution: 200 milligram(s) intramuscularly every 2 weeks Does not need fill today; given 5/2/23; NEXT DUE: 5/16/23 MDD: 200 mg day of injection

## 2023-05-02 NOTE — BH INPATIENT PSYCHIATRY PROGRESS NOTE - NSBHMETABOLIC_PSY_ALL_CORE_FT
BMI: BMI (kg/m2): 21.6 (04-19-23 @ 20:36)  HbA1c:   Glucose: POCT Blood Glucose.: 147 mg/dL (04-14-23 @ 21:44)    BP: 131/94 (04-21-23 @ 06:32) (131/94 - 131/94)  Lipid Panel: 
BMI: BMI (kg/m2): 21.6 (04-19-23 @ 20:36)  HbA1c:   Glucose: POCT Blood Glucose.: 147 mg/dL (04-14-23 @ 21:44)    BP: 131/94 (04-21-23 @ 06:32) (131/94 - 131/94)  Lipid Panel: 
BMI: BMI (kg/m2): 21.6 (04-22-23 @ 00:38)  HbA1c:   Glucose: POCT Blood Glucose.: 86 mg/dL (04-22-23 @ 00:44)    BP: 112/71 (04-25-23 @ 08:45) (112/71 - 112/71)  Lipid Panel: 
BMI: BMI (kg/m2): 21.6 (04-22-23 @ 00:38)  HbA1c:   Glucose: POCT Blood Glucose.: 86 mg/dL (04-22-23 @ 00:44)    BP: 141/85 (04-22-23 @ 08:48) (131/94 - 141/85)  Lipid Panel: 
BMI: BMI (kg/m2): 21.6 (04-22-23 @ 00:38)  HbA1c:   Glucose: POCT Blood Glucose.: 86 mg/dL (04-22-23 @ 00:44)    BP: 119/76 (04-23-23 @ 07:43) (119/76 - 141/85)  Lipid Panel: 
BMI: BMI (kg/m2): 21.6 (04-22-23 @ 00:38)  HbA1c:   Glucose: POCT Blood Glucose.: 86 mg/dL (04-22-23 @ 00:44)    BP: --  Lipid Panel: 
BMI: BMI (kg/m2): 21.6 (04-22-23 @ 00:38)  HbA1c:   Glucose: POCT Blood Glucose.: 86 mg/dL (04-22-23 @ 00:44)    BP: 112/71 (04-25-23 @ 08:45) (105/78 - 119/76)  Lipid Panel: 
BMI: BMI (kg/m2): 21.6 (04-22-23 @ 00:38)  HbA1c:   Glucose: POCT Blood Glucose.: 86 mg/dL (04-22-23 @ 00:44)    BP: 105/78 (04-23-23 @ 20:27) (105/78 - 141/85)  Lipid Panel: 
BMI: BMI (kg/m2): 21.6 (04-22-23 @ 00:38)  HbA1c:   Glucose: POCT Blood Glucose.: 86 mg/dL (04-22-23 @ 00:44)    BP: --  Lipid Panel:

## 2023-05-02 NOTE — BH INPATIENT PSYCHIATRY PROGRESS NOTE - NSTXFALLDATEEST_PSY_ALL_CORE
22-Apr-2023
26-Apr-2023
26-Apr-2023
22-Apr-2023
26-Apr-2023

## 2023-05-02 NOTE — BH TREATMENT PLAN - NSTXSUICIDGOAL_PSY_ALL_CORE
Be able to report that they independently used a coping skill instead of hurting oneself
Will verbalize a decrease in preoccupation with suicidal thoughts and / or intent to commit suicide to 2 on a 10-point scale
Will verbalize a decrease in preoccupation with suicidal thoughts and / or intent to commit suicide to 2 on a 10-point scale

## 2023-05-02 NOTE — BH INPATIENT PSYCHIATRY PROGRESS NOTE - NSBHFUPINTERVALCCFT_PSY_A_CORE
F/u for aggression+agiation+mood lability+SI+OD
reported feeling "okay". 
much better  f/u for BAD+aggression
much better  f/u for BAD+aggression
f/u mood and SI
much better  f/u for BAD+aggression
reported feeling "good". 
reported feeling "okay". 
much better  f/u for BAD+aggression
reported feeling "okay". 
much better  f/u for BAD+aggression

## 2023-05-02 NOTE — BH INPATIENT PSYCHIATRY PROGRESS NOTE - NSTXCOPEINTERMD_PSY_ALL_CORE
Psychopharm with possible goal of MARCELINO and supportive therapy with appropriate aftercare

## 2023-05-02 NOTE — BH DISCHARGE NOTE NURSING/SOCIAL WORK/PSYCH REHAB - PATIENT PORTAL LINK FT
You can access the FollowMyHealth Patient Portal offered by [facility] by registering at the following website: [email or web address]. By joining [employer]’s FollowMyHealth portal, you will also be able to view your health information using other applications (apps) compatible with our system.

## 2023-05-02 NOTE — BH INPATIENT PSYCHIATRY DISCHARGE NOTE - NSBHMETABOLIC_PSY_ALL_CORE_FT
BMI: BMI (kg/m2): 21.6 (04-22-23 @ 00:38)  HbA1c:   Glucose: POCT Blood Glucose.: 86 mg/dL (04-22-23 @ 00:44)    BP: --  Lipid Panel:
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
## 2023-05-02 NOTE — BH INPATIENT PSYCHIATRY PROGRESS NOTE - NSTXDCFAMDATETRGT_PSY_ALL_CORE
28-Apr-2023
05-May-2023
28-Apr-2023
05-May-2023
28-Apr-2023

## 2023-05-02 NOTE — BH TREATMENT PLAN - NSCMSPTSTRENGTHS_PSY_ALL_CORE
Physically healthy/Resourceful/Self-reliant/Supportive family
Physically healthy/Resourceful/Self-reliant/Supportive family
Compliance to treatment/Flexibility/Leisure interest/Physically healthy/Resourceful/Self-reliant/Supportive family

## 2023-05-02 NOTE — BH INPATIENT PSYCHIATRY DISCHARGE NOTE - NSBHDCRISKMITIGATE_PSY_ALL_CORE
Safety planning/Reduction in access to lethal methods (pills, firearms, etc)/Referral to PHP/Family/Other social support involvement/Long acting injectable medication/DBT Program/Other

## 2023-05-02 NOTE — BH INPATIENT PSYCHIATRY PROGRESS NOTE - NSTXANXGOAL_PSY_ALL_CORE
Be able to participate in activities despite lingering anxiety/panic

## 2023-05-02 NOTE — BH DISCHARGE NOTE NURSING/SOCIAL WORK/PSYCH REHAB - DISCHARGE INSTRUCTIONS AFTERCARE APPOINTMENTS
In order to check the location, date, or time of your aftercare appointment, please refer to your Discharge Instructions Document given to you upon leaving the hospital.  If you have lost the instructions please call [phone]

## 2023-05-02 NOTE — BH PSYCHOLOGY - GROUP THERAPY NOTE - NSPSYCHOLGRPBILLING_PSY_A_CORE
[id number] - Group Psychotherapy
[id number] - Group Psychotherapy
57785 - Group Psychotherapy
41747 - Group Psychotherapy
61028 - Group Psychotherapy
29577 - Group Psychotherapy
24254 - Group Psychotherapy
81972 - Group Psychotherapy
98001 - Group Psychotherapy

## 2023-05-03 RX ORDER — OMEPRAZOLE 10 MG/1
1 CAPSULE, DELAYED RELEASE ORAL
Qty: 30 | Refills: 0
Start: 2023-05-03 | End: 2023-06-01

## 2023-05-04 ENCOUNTER — OUTPATIENT (OUTPATIENT)
Dept: OUTPATIENT SERVICES | Facility: HOSPITAL | Age: 23
LOS: 1 days | Discharge: HOME | End: 2023-05-04
Payer: COMMERCIAL

## 2023-05-04 DIAGNOSIS — Z98.84 BARIATRIC SURGERY STATUS: Chronic | ICD-10-CM

## 2023-05-04 PROCEDURE — 90792 PSYCH DIAG EVAL W/MED SRVCS: CPT | Mod: 95

## 2023-05-05 DIAGNOSIS — F31.9 BIPOLAR DISORDER, UNSPECIFIED: ICD-10-CM

## 2023-05-09 PROCEDURE — 99214 OFFICE O/P EST MOD 30 MIN: CPT

## 2023-05-15 PROCEDURE — 99214 OFFICE O/P EST MOD 30 MIN: CPT

## 2023-05-19 LAB
A1C WITH ESTIMATED AVERAGE GLUCOSE RESULT: 5.2 % — SIGNIFICANT CHANGE UP (ref 4–5.6)
ALBUMIN SERPL ELPH-MCNC: 4.2 G/DL — SIGNIFICANT CHANGE UP (ref 3.3–5)
ALP SERPL-CCNC: 64 U/L — SIGNIFICANT CHANGE UP (ref 40–120)
ALT FLD-CCNC: 8 U/L — SIGNIFICANT CHANGE UP (ref 4–41)
ANION GAP SERPL CALC-SCNC: 12 MMOL/L — SIGNIFICANT CHANGE UP (ref 7–14)
APPEARANCE UR: ABNORMAL
AST SERPL-CCNC: 17 U/L — SIGNIFICANT CHANGE UP (ref 4–40)
BASOPHILS # BLD AUTO: 0.02 K/UL — SIGNIFICANT CHANGE UP (ref 0–0.2)
BASOPHILS NFR BLD AUTO: 0.5 % — SIGNIFICANT CHANGE UP (ref 0–2)
BILIRUB SERPL-MCNC: 0.3 MG/DL — SIGNIFICANT CHANGE UP (ref 0.2–1.2)
BILIRUB UR-MCNC: NEGATIVE — SIGNIFICANT CHANGE UP
BUN SERPL-MCNC: 11 MG/DL — SIGNIFICANT CHANGE UP (ref 7–23)
CALCIUM SERPL-MCNC: 9.7 MG/DL — SIGNIFICANT CHANGE UP (ref 8.4–10.5)
CHLORIDE SERPL-SCNC: 104 MMOL/L — SIGNIFICANT CHANGE UP (ref 98–107)
CHOLEST SERPL-MCNC: 153 MG/DL — SIGNIFICANT CHANGE UP
CO2 SERPL-SCNC: 25 MMOL/L — SIGNIFICANT CHANGE UP (ref 22–31)
COLOR SPEC: YELLOW — SIGNIFICANT CHANGE UP
CREAT SERPL-MCNC: 0.88 MG/DL — SIGNIFICANT CHANGE UP (ref 0.5–1.3)
DIFF PNL FLD: NEGATIVE — SIGNIFICANT CHANGE UP
EGFR: 123 ML/MIN/1.73M2 — SIGNIFICANT CHANGE UP
EOSINOPHIL # BLD AUTO: 0.11 K/UL — SIGNIFICANT CHANGE UP (ref 0–0.5)
EOSINOPHIL NFR BLD AUTO: 2.7 % — SIGNIFICANT CHANGE UP (ref 0–6)
ESTIMATED AVERAGE GLUCOSE: 103 — SIGNIFICANT CHANGE UP
GLUCOSE SERPL-MCNC: 79 MG/DL — SIGNIFICANT CHANGE UP (ref 70–99)
GLUCOSE UR QL: NEGATIVE — SIGNIFICANT CHANGE UP
HCT VFR BLD CALC: 37.9 % — LOW (ref 39–50)
HDLC SERPL-MCNC: 35 MG/DL — LOW
HGB BLD-MCNC: 11.7 G/DL — LOW (ref 13–17)
IANC: 2.16 K/UL — SIGNIFICANT CHANGE UP (ref 1.8–7.4)
IMM GRANULOCYTES NFR BLD AUTO: 0.2 % — SIGNIFICANT CHANGE UP (ref 0–0.9)
KETONES UR-MCNC: ABNORMAL
LEUKOCYTE ESTERASE UR-ACNC: NEGATIVE — SIGNIFICANT CHANGE UP
LIPID PNL WITH DIRECT LDL SERPL: 106 MG/DL — HIGH
LYMPHOCYTES # BLD AUTO: 1.37 K/UL — SIGNIFICANT CHANGE UP (ref 1–3.3)
LYMPHOCYTES # BLD AUTO: 34.1 % — SIGNIFICANT CHANGE UP (ref 13–44)
MCHC RBC-ENTMCNC: 26.9 PG — LOW (ref 27–34)
MCHC RBC-ENTMCNC: 30.9 GM/DL — LOW (ref 32–36)
MCV RBC AUTO: 87.1 FL — SIGNIFICANT CHANGE UP (ref 80–100)
MONOCYTES # BLD AUTO: 0.35 K/UL — SIGNIFICANT CHANGE UP (ref 0–0.9)
MONOCYTES NFR BLD AUTO: 8.7 % — SIGNIFICANT CHANGE UP (ref 2–14)
NEUTROPHILS # BLD AUTO: 2.16 K/UL — SIGNIFICANT CHANGE UP (ref 1.8–7.4)
NEUTROPHILS NFR BLD AUTO: 53.8 % — SIGNIFICANT CHANGE UP (ref 43–77)
NITRITE UR-MCNC: NEGATIVE — SIGNIFICANT CHANGE UP
NON HDL CHOLESTEROL: 118 MG/DL — SIGNIFICANT CHANGE UP
NRBC # BLD: 0 /100 WBCS — SIGNIFICANT CHANGE UP (ref 0–0)
NRBC # FLD: 0 K/UL — SIGNIFICANT CHANGE UP (ref 0–0)
PCP SPEC-MCNC: SIGNIFICANT CHANGE UP
PH UR: 6.5 — SIGNIFICANT CHANGE UP (ref 5–8)
PLATELET # BLD AUTO: 225 K/UL — SIGNIFICANT CHANGE UP (ref 150–400)
POTASSIUM SERPL-MCNC: 4.5 MMOL/L — SIGNIFICANT CHANGE UP (ref 3.5–5.3)
POTASSIUM SERPL-SCNC: 4.5 MMOL/L — SIGNIFICANT CHANGE UP (ref 3.5–5.3)
PROT SERPL-MCNC: 7.2 G/DL — SIGNIFICANT CHANGE UP (ref 6–8.3)
PROT UR-MCNC: ABNORMAL
RBC # BLD: 4.35 M/UL — SIGNIFICANT CHANGE UP (ref 4.2–5.8)
RBC # FLD: 15.3 % — HIGH (ref 10.3–14.5)
SODIUM SERPL-SCNC: 141 MMOL/L — SIGNIFICANT CHANGE UP (ref 135–145)
SP GR SPEC: 1.03 — SIGNIFICANT CHANGE UP (ref 1.01–1.05)
TRIGL SERPL-MCNC: 60 MG/DL — SIGNIFICANT CHANGE UP
UROBILINOGEN FLD QL: ABNORMAL
WBC # BLD: 4.02 K/UL — SIGNIFICANT CHANGE UP (ref 3.8–10.5)
WBC # FLD AUTO: 4.02 K/UL — SIGNIFICANT CHANGE UP (ref 3.8–10.5)

## 2023-05-23 PROCEDURE — 99213 OFFICE O/P EST LOW 20 MIN: CPT

## 2023-06-06 ENCOUNTER — OUTPATIENT (OUTPATIENT)
Dept: OUTPATIENT SERVICES | Facility: HOSPITAL | Age: 23
LOS: 1 days | Discharge: LEFT BEFORE DISCHARGE | End: 2023-06-06
Payer: COMMERCIAL

## 2023-06-06 DIAGNOSIS — Z98.84 BARIATRIC SURGERY STATUS: Chronic | ICD-10-CM

## 2023-06-08 PROCEDURE — 90792 PSYCH DIAG EVAL W/MED SRVCS: CPT | Mod: 95

## 2023-06-29 DIAGNOSIS — F31.9 BIPOLAR DISORDER, UNSPECIFIED: ICD-10-CM

## 2023-07-18 ENCOUNTER — NON-APPOINTMENT (OUTPATIENT)
Age: 23
End: 2023-07-18

## 2023-07-20 ENCOUNTER — APPOINTMENT (OUTPATIENT)
Dept: RADIOLOGY | Facility: CLINIC | Age: 23
End: 2023-07-20
Payer: COMMERCIAL

## 2023-07-20 ENCOUNTER — OUTPATIENT (OUTPATIENT)
Dept: OUTPATIENT SERVICES | Facility: HOSPITAL | Age: 23
LOS: 1 days | End: 2023-07-20
Payer: COMMERCIAL

## 2023-07-20 DIAGNOSIS — Z98.84 BARIATRIC SURGERY STATUS: Chronic | ICD-10-CM

## 2023-07-20 DIAGNOSIS — M54.2 CERVICALGIA: ICD-10-CM

## 2023-07-20 DIAGNOSIS — S43.402A UNSPECIFIED SPRAIN OF LEFT SHOULDER JOINT, INITIAL ENCOUNTER: ICD-10-CM

## 2023-07-20 DIAGNOSIS — S00.33XA CONTUSION OF NOSE, INITIAL ENCOUNTER: ICD-10-CM

## 2023-07-20 DIAGNOSIS — Z00.8 ENCOUNTER FOR OTHER GENERAL EXAMINATION: ICD-10-CM

## 2023-07-20 PROCEDURE — 70160 X-RAY EXAM OF NASAL BONES: CPT | Mod: 26

## 2023-07-20 PROCEDURE — 72040 X-RAY EXAM NECK SPINE 2-3 VW: CPT | Mod: 26

## 2023-07-20 PROCEDURE — 73030 X-RAY EXAM OF SHOULDER: CPT

## 2023-07-20 PROCEDURE — 73030 X-RAY EXAM OF SHOULDER: CPT | Mod: 26,LT

## 2023-07-20 PROCEDURE — 72040 X-RAY EXAM NECK SPINE 2-3 VW: CPT

## 2023-07-20 PROCEDURE — 70160 X-RAY EXAM OF NASAL BONES: CPT

## 2023-07-25 PROCEDURE — 99214 OFFICE O/P EST MOD 30 MIN: CPT | Mod: 95

## 2023-07-31 ENCOUNTER — NON-APPOINTMENT (OUTPATIENT)
Age: 23
End: 2023-07-31

## 2023-08-24 ENCOUNTER — APPOINTMENT (OUTPATIENT)
Dept: OTOLARYNGOLOGY | Facility: CLINIC | Age: 23
End: 2023-08-24

## 2023-09-12 PROCEDURE — 99214 OFFICE O/P EST MOD 30 MIN: CPT | Mod: 95

## 2023-09-15 PROCEDURE — 90832 PSYTX W PT 30 MINUTES: CPT

## 2023-10-05 PROCEDURE — 99214 OFFICE O/P EST MOD 30 MIN: CPT | Mod: 95

## 2023-10-26 PROCEDURE — 99214 OFFICE O/P EST MOD 30 MIN: CPT | Mod: 95

## 2023-12-20 ENCOUNTER — OUTPATIENT (OUTPATIENT)
Dept: OUTPATIENT SERVICES | Facility: HOSPITAL | Age: 23
LOS: 1 days | Discharge: STILL ADMITTED OR ADMINISTRATIVE | End: 2023-12-20
Payer: COMMERCIAL

## 2023-12-20 DIAGNOSIS — Z98.84 BARIATRIC SURGERY STATUS: Chronic | ICD-10-CM

## 2023-12-20 PROCEDURE — 90839 PSYTX CRISIS INITIAL 60 MIN: CPT

## 2023-12-20 PROCEDURE — 99214 OFFICE O/P EST MOD 30 MIN: CPT | Mod: 25

## 2023-12-28 DIAGNOSIS — F31.9 BIPOLAR DISORDER, UNSPECIFIED: ICD-10-CM

## 2024-01-22 ENCOUNTER — OUTPATIENT (OUTPATIENT)
Dept: OUTPATIENT SERVICES | Facility: HOSPITAL | Age: 24
LOS: 1 days | Discharge: STILL ADMITTED OR ADMINISTRATIVE | End: 2024-01-22
Payer: COMMERCIAL

## 2024-01-22 DIAGNOSIS — Z98.84 BARIATRIC SURGERY STATUS: Chronic | ICD-10-CM

## 2024-01-22 PROCEDURE — 90839 PSYTX CRISIS INITIAL 60 MIN: CPT

## 2024-01-23 DIAGNOSIS — F31.9 BIPOLAR DISORDER, UNSPECIFIED: ICD-10-CM

## 2024-01-23 NOTE — BH PSYCHOLOGY - GROUP THERAPY NOTE - NSBHPSYCHOLGRPNUM_PSY_A_CORE
Patient made aware of elevated liver enzymes.  He is now on a lower dose of the Lipitor.  Form completed.   11

## 2024-01-24 PROCEDURE — 99213 OFFICE O/P EST LOW 20 MIN: CPT

## 2024-03-08 ENCOUNTER — OUTPATIENT (OUTPATIENT)
Dept: OUTPATIENT SERVICES | Facility: HOSPITAL | Age: 24
LOS: 1 days | Discharge: STILL ADMITTED OR ADMINISTRATIVE | End: 2024-03-08
Payer: COMMERCIAL

## 2024-03-08 DIAGNOSIS — Z98.84 BARIATRIC SURGERY STATUS: Chronic | ICD-10-CM

## 2024-03-08 PROCEDURE — 99214 OFFICE O/P EST MOD 30 MIN: CPT

## 2024-03-11 DIAGNOSIS — F31.9 BIPOLAR DISORDER, UNSPECIFIED: ICD-10-CM

## 2024-05-03 NOTE — ED ADULT NURSE NOTE - NS TRANSFER PATIENT BELONGINGS
Vinicio Sena  Internal Medicine  [address] [place] [address]  Phone: [phone]  Fax: [phone]  Follow Up Time: 1-3 days  
Clothing

## 2024-09-18 NOTE — BH INPATIENT PSYCHIATRY ASSESSMENT NOTE - NSBHCRANIAL_PSY_ALL_CORE
Patient was notified of  her appointment with Dr. Mcwilliams on the 24th has been cancelled because he is no longer with us. Someone will call her to reschedule. She ok/  
Recognizes 2 fingers or can read (II)/Smiles, shows teeth, opens mouth, sticks out tongue (V, VII, XI)/Normal speech (IX, X, XII)/Extraocular Eye Movement Intact  (III, IV, VI)/Shoulder shrug (XI)/Hearing intact (VIII)

## 2024-09-19 NOTE — BH CONSULTATION LIAISON PROGRESS NOTE - NSBHCONSULTFOLLOW_PSY_ALL_CORE
Yes...
Treatment Goal Explanation (Does Not Render In The Note): Stable for the purposes of categorizing medical decision making is defined by the specific treatment goals for an individual patient. A patient that is not at their treatment goal is not stable, even if the condition has not changed and there is no short- term threat to life or function.

## 2024-09-20 NOTE — BH INPATIENT PSYCHIATRY PROGRESS NOTE - NSTXPROBFALL_PSY_ALL_CORE
Pt advised to continue with Rinvoq 15 mg as prescribed, and continue follow up with Rheum.          
FALL RISK

## 2025-03-04 NOTE — H&P PST ADULT - NSANTHAGERD_ENT_A_CORE
DVT ppx: Hep gtt  Fall and aspiration precautions.  Turn and reposition q2h to prevent bedsores  Skin checks as per RN No

## 2025-07-23 NOTE — BH PATIENT PROFILE - NSGHSEXHXOFFENSE_PSY_ALL_CORE
Blood pressure is at goal continue current regimen       
{If prescribing weight loss medication, click here to fill out prior auth smartform and then hit F2 with this smartlist to insert prior auth documentation (Optional):[id number]}  Diet and exercise to promote weight loss       
None